# Patient Record
Sex: FEMALE | Race: WHITE | NOT HISPANIC OR LATINO | URBAN - METROPOLITAN AREA
[De-identification: names, ages, dates, MRNs, and addresses within clinical notes are randomized per-mention and may not be internally consistent; named-entity substitution may affect disease eponyms.]

---

## 2018-03-19 ENCOUNTER — OFFICE VISIT CONVERTED (OUTPATIENT)
Dept: ONCOLOGY | Facility: HOSPITAL | Age: 38
End: 2018-03-19
Attending: INTERNAL MEDICINE

## 2018-05-08 ENCOUNTER — OFFICE VISIT CONVERTED (OUTPATIENT)
Dept: INTERNAL MEDICINE | Facility: CLINIC | Age: 38
End: 2018-05-08
Attending: INTERNAL MEDICINE

## 2018-05-30 ENCOUNTER — OFFICE VISIT CONVERTED (OUTPATIENT)
Dept: ONCOLOGY | Facility: HOSPITAL | Age: 38
End: 2018-05-30
Attending: NURSE PRACTITIONER

## 2018-08-29 ENCOUNTER — OFFICE VISIT CONVERTED (OUTPATIENT)
Dept: ONCOLOGY | Facility: HOSPITAL | Age: 38
End: 2018-08-29
Attending: NURSE PRACTITIONER

## 2018-08-31 ENCOUNTER — OFFICE VISIT CONVERTED (OUTPATIENT)
Dept: INTERNAL MEDICINE | Facility: CLINIC | Age: 38
End: 2018-08-31
Attending: PHYSICIAN ASSISTANT

## 2018-09-20 ENCOUNTER — OFFICE VISIT CONVERTED (OUTPATIENT)
Dept: ORTHOPEDIC SURGERY | Facility: CLINIC | Age: 38
End: 2018-09-20
Attending: ORTHOPAEDIC SURGERY

## 2018-09-20 ENCOUNTER — CONVERSION ENCOUNTER (OUTPATIENT)
Dept: ORTHOPEDIC SURGERY | Facility: CLINIC | Age: 38
End: 2018-09-20

## 2018-10-26 ENCOUNTER — CONVERSION ENCOUNTER (OUTPATIENT)
Dept: ORTHOPEDIC SURGERY | Facility: CLINIC | Age: 38
End: 2018-10-26

## 2018-10-26 ENCOUNTER — OFFICE VISIT CONVERTED (OUTPATIENT)
Dept: ORTHOPEDIC SURGERY | Facility: CLINIC | Age: 38
End: 2018-10-26
Attending: PHYSICIAN ASSISTANT

## 2018-11-09 ENCOUNTER — OFFICE VISIT CONVERTED (OUTPATIENT)
Dept: ORTHOPEDIC SURGERY | Facility: CLINIC | Age: 38
End: 2018-11-09
Attending: PHYSICIAN ASSISTANT

## 2018-12-20 ENCOUNTER — OFFICE VISIT CONVERTED (OUTPATIENT)
Dept: ORTHOPEDIC SURGERY | Facility: CLINIC | Age: 38
End: 2018-12-20
Attending: PHYSICIAN ASSISTANT

## 2019-01-17 ENCOUNTER — OFFICE VISIT CONVERTED (OUTPATIENT)
Dept: ORTHOPEDIC SURGERY | Facility: CLINIC | Age: 39
End: 2019-01-17
Attending: PHYSICIAN ASSISTANT

## 2019-01-28 ENCOUNTER — OFFICE VISIT CONVERTED (OUTPATIENT)
Dept: INTERNAL MEDICINE | Facility: CLINIC | Age: 39
End: 2019-01-28
Attending: INTERNAL MEDICINE

## 2019-03-04 ENCOUNTER — HOSPITAL ENCOUNTER (OUTPATIENT)
Dept: OTHER | Facility: HOSPITAL | Age: 39
Discharge: HOME | End: 2019-03-04
Attending: INTERNAL MEDICINE

## 2019-03-04 LAB
ALBUMIN SERPL-MCNC: 3.8 G/DL (ref 3.5–5)
ALBUMIN/GLOB SERPL: 1.4 {RATIO} (ref 1.4–2.6)
ALP SERPL-CCNC: 69 U/L (ref 42–98)
ALT SERPL-CCNC: 8 U/L (ref 10–40)
ANION GAP SERPL CALC-SCNC: 15 MMOL/L (ref 8–19)
AST SERPL-CCNC: 15 U/L (ref 15–50)
BASOPHILS # BLD AUTO: 0.04 10*3/UL (ref 0–0.2)
BASOPHILS NFR BLD AUTO: 0.8 % (ref 0–3)
BILIRUB SERPL-MCNC: 0.36 MG/DL (ref 0.2–1.3)
BUN SERPL-MCNC: 13 MG/DL (ref 5–25)
BUN/CREAT SERPL: 14 {RATIO} (ref 6–20)
CALCIUM SERPL-MCNC: 9 MG/DL (ref 8.7–10.4)
CHLORIDE SERPL-SCNC: 103 MMOL/L (ref 99–111)
CHOLEST SERPL-MCNC: 194 MG/DL (ref 107–200)
CHOLEST/HDLC SERPL: 1.9 {RATIO} (ref 3–6)
CONV ABS IMM GRAN: 0 10*3/UL (ref 0–0.2)
CONV CO2: 26 MMOL/L (ref 22–32)
CONV IMMATURE GRAN: 0 % (ref 0–1.8)
CONV TOTAL PROTEIN: 6.6 G/DL (ref 6.3–8.2)
CREAT UR-MCNC: 0.91 MG/DL (ref 0.5–0.9)
DEPRECATED RDW RBC AUTO: 43.5 FL (ref 36.4–46.3)
EOSINOPHIL # BLD AUTO: 0.2 10*3/UL (ref 0–0.7)
EOSINOPHIL # BLD AUTO: 4.1 % (ref 0–7)
ERYTHROCYTE [DISTWIDTH] IN BLOOD BY AUTOMATED COUNT: 12.7 % (ref 11.7–14.4)
GFR SERPLBLD BASED ON 1.73 SQ M-ARVRAT: >60 ML/MIN/{1.73_M2}
GLOBULIN UR ELPH-MCNC: 2.8 G/DL (ref 2–3.5)
GLUCOSE SERPL-MCNC: 85 MG/DL (ref 65–99)
HBA1C MFR BLD: 13.7 G/DL (ref 12–16)
HCT VFR BLD AUTO: 41.6 % (ref 37–47)
HDLC SERPL-MCNC: 100 MG/DL (ref 40–60)
LDLC SERPL CALC-MCNC: 78 MG/DL (ref 70–100)
LYMPHOCYTES # BLD AUTO: 1.94 10*3/UL (ref 1–5)
MCH RBC QN AUTO: 30.6 PG (ref 27–31)
MCHC RBC AUTO-ENTMCNC: 32.9 G/DL (ref 33–37)
MCV RBC AUTO: 93.1 FL (ref 81–99)
MONOCYTES # BLD AUTO: 0.31 10*3/UL (ref 0.2–1.2)
MONOCYTES NFR BLD AUTO: 6.4 % (ref 3–10)
NEUTROPHILS # BLD AUTO: 2.33 10*3/UL (ref 2–8)
NEUTROPHILS NFR BLD AUTO: 48.5 % (ref 30–85)
NRBC CBCN: 0 % (ref 0–0.7)
OSMOLALITY SERPL CALC.SUM OF ELEC: 289 MOSM/KG (ref 273–304)
PLATELET # BLD AUTO: 361 10*3/UL (ref 130–400)
PMV BLD AUTO: 9.8 FL (ref 9.4–12.3)
POTASSIUM SERPL-SCNC: 4.1 MMOL/L (ref 3.5–5.3)
RBC # BLD AUTO: 4.47 10*6/UL (ref 4.2–5.4)
SODIUM SERPL-SCNC: 140 MMOL/L (ref 135–147)
T4 FREE SERPL-MCNC: 1.8 NG/DL (ref 0.9–1.8)
TRIGL SERPL-MCNC: 82 MG/DL (ref 40–150)
TSH SERPL-ACNC: 1.65 M[IU]/L (ref 0.27–4.2)
VARIANT LYMPHS NFR BLD MANUAL: 40.2 % (ref 20–45)
VLDLC SERPL-MCNC: 16 MG/DL (ref 5–37)
WBC # BLD AUTO: 4.82 10*3/UL (ref 4.8–10.8)

## 2019-09-30 ENCOUNTER — CONVERSION ENCOUNTER (OUTPATIENT)
Dept: INTERNAL MEDICINE | Facility: CLINIC | Age: 39
End: 2019-09-30

## 2019-09-30 ENCOUNTER — OFFICE VISIT CONVERTED (OUTPATIENT)
Dept: INTERNAL MEDICINE | Facility: CLINIC | Age: 39
End: 2019-09-30
Attending: NURSE PRACTITIONER

## 2019-11-11 ENCOUNTER — HOSPITAL ENCOUNTER (OUTPATIENT)
Dept: OTHER | Facility: HOSPITAL | Age: 39
Discharge: HOME | End: 2019-11-11
Attending: NURSE PRACTITIONER

## 2019-11-11 ENCOUNTER — OFFICE VISIT CONVERTED (OUTPATIENT)
Dept: INTERNAL MEDICINE | Facility: CLINIC | Age: 39
End: 2019-11-11
Attending: NURSE PRACTITIONER

## 2019-11-11 ENCOUNTER — CONVERSION ENCOUNTER (OUTPATIENT)
Dept: INTERNAL MEDICINE | Facility: CLINIC | Age: 39
End: 2019-11-11

## 2019-11-11 LAB
ALBUMIN SERPL-MCNC: 3.9 G/DL (ref 3.5–5)
ALBUMIN/GLOB SERPL: 1.3 {RATIO} (ref 1.4–2.6)
ALP SERPL-CCNC: 80 U/L (ref 42–98)
ALT SERPL-CCNC: 8 U/L (ref 10–40)
ANION GAP SERPL CALC-SCNC: 17 MMOL/L (ref 8–19)
AST SERPL-CCNC: 16 U/L (ref 15–50)
BASOPHILS # BLD AUTO: 0.05 10*3/UL (ref 0–0.2)
BASOPHILS NFR BLD AUTO: 0.8 % (ref 0–3)
BILIRUB SERPL-MCNC: 0.28 MG/DL (ref 0.2–1.3)
BUN SERPL-MCNC: 12 MG/DL (ref 5–25)
BUN/CREAT SERPL: 13 {RATIO} (ref 6–20)
CALCIUM SERPL-MCNC: 9 MG/DL (ref 8.7–10.4)
CHLORIDE SERPL-SCNC: 103 MMOL/L (ref 99–111)
CONV ABS IMM GRAN: 0.03 10*3/UL (ref 0–0.2)
CONV CO2: 24 MMOL/L (ref 22–32)
CONV IMMATURE GRAN: 0.5 % (ref 0–1.8)
CONV TOTAL PROTEIN: 7 G/DL (ref 6.3–8.2)
CREAT UR-MCNC: 0.89 MG/DL (ref 0.5–0.9)
DEPRECATED RDW RBC AUTO: 44.4 FL (ref 36.4–46.3)
EOSINOPHIL # BLD AUTO: 0.16 10*3/UL (ref 0–0.7)
EOSINOPHIL # BLD AUTO: 2.6 % (ref 0–7)
ERYTHROCYTE [DISTWIDTH] IN BLOOD BY AUTOMATED COUNT: 13.1 % (ref 11.7–14.4)
GFR SERPLBLD BASED ON 1.73 SQ M-ARVRAT: >60 ML/MIN/{1.73_M2}
GLOBULIN UR ELPH-MCNC: 3.1 G/DL (ref 2–3.5)
GLUCOSE SERPL-MCNC: 82 MG/DL (ref 65–99)
HCT VFR BLD AUTO: 43.2 % (ref 37–47)
HGB BLD-MCNC: 13.7 G/DL (ref 12–16)
LYMPHOCYTES # BLD AUTO: 1.84 10*3/UL (ref 1–5)
LYMPHOCYTES NFR BLD AUTO: 30.3 % (ref 20–45)
MCH RBC QN AUTO: 29.5 PG (ref 27–31)
MCHC RBC AUTO-ENTMCNC: 31.7 G/DL (ref 33–37)
MCV RBC AUTO: 93.1 FL (ref 81–99)
MONOCYTES # BLD AUTO: 0.48 10*3/UL (ref 0.2–1.2)
MONOCYTES NFR BLD AUTO: 7.9 % (ref 3–10)
NEUTROPHILS # BLD AUTO: 3.52 10*3/UL (ref 2–8)
NEUTROPHILS NFR BLD AUTO: 57.9 % (ref 30–85)
NRBC CBCN: 0 % (ref 0–0.7)
OSMOLALITY SERPL CALC.SUM OF ELEC: 289 MOSM/KG (ref 273–304)
PLATELET # BLD AUTO: 361 10*3/UL (ref 130–400)
PMV BLD AUTO: 10.3 FL (ref 9.4–12.3)
POTASSIUM SERPL-SCNC: 4.3 MMOL/L (ref 3.5–5.3)
RBC # BLD AUTO: 4.64 10*6/UL (ref 4.2–5.4)
SODIUM SERPL-SCNC: 140 MMOL/L (ref 135–147)
T4 FREE SERPL-MCNC: 1.3 NG/DL (ref 0.9–1.8)
TSH SERPL-ACNC: 1.27 M[IU]/L (ref 0.27–4.2)
WBC # BLD AUTO: 6.08 10*3/UL (ref 4.8–10.8)

## 2019-11-13 ENCOUNTER — HOSPITAL ENCOUNTER (OUTPATIENT)
Dept: CARDIOLOGY | Facility: HOSPITAL | Age: 39
Discharge: HOME | End: 2019-11-13
Attending: NURSE PRACTITIONER

## 2019-12-20 ENCOUNTER — OFFICE VISIT CONVERTED (OUTPATIENT)
Dept: INTERNAL MEDICINE | Facility: CLINIC | Age: 39
End: 2019-12-20
Attending: PHYSICIAN ASSISTANT

## 2020-03-31 ENCOUNTER — HOSPITAL ENCOUNTER (OUTPATIENT)
Dept: OTHER | Facility: HOSPITAL | Age: 40
Discharge: HOME | End: 2020-03-31
Attending: NURSE PRACTITIONER

## 2020-03-31 ENCOUNTER — OFFICE VISIT CONVERTED (OUTPATIENT)
Dept: INTERNAL MEDICINE | Facility: CLINIC | Age: 40
End: 2020-03-31
Attending: NURSE PRACTITIONER

## 2020-04-03 ENCOUNTER — TELEMEDICINE CONVERTED (OUTPATIENT)
Dept: INTERNAL MEDICINE | Facility: CLINIC | Age: 40
End: 2020-04-03
Attending: NURSE PRACTITIONER

## 2020-04-22 ENCOUNTER — HOSPITAL ENCOUNTER (OUTPATIENT)
Dept: LAB | Facility: HOSPITAL | Age: 40
Discharge: HOME | End: 2020-04-22
Attending: INTERNAL MEDICINE

## 2020-04-22 LAB
ALBUMIN SERPL-MCNC: 3.8 G/DL (ref 3.5–5)
ALBUMIN/GLOB SERPL: 1.4 {RATIO} (ref 1.4–2.6)
ALP SERPL-CCNC: 67 U/L (ref 42–98)
ALT SERPL-CCNC: 8 U/L (ref 10–40)
ANION GAP SERPL CALC-SCNC: 17 MMOL/L (ref 8–19)
AST SERPL-CCNC: 14 U/L (ref 15–50)
BASOPHILS # BLD AUTO: 0.04 10*3/UL (ref 0–0.2)
BASOPHILS NFR BLD AUTO: 0.7 % (ref 0–3)
BILIRUB SERPL-MCNC: 0.19 MG/DL (ref 0.2–1.3)
BUN SERPL-MCNC: 11 MG/DL (ref 5–25)
BUN/CREAT SERPL: 14 {RATIO} (ref 6–20)
CALCIUM SERPL-MCNC: 9 MG/DL (ref 8.7–10.4)
CHLORIDE SERPL-SCNC: 102 MMOL/L (ref 99–111)
CONV ABS IMM GRAN: 0.01 10*3/UL (ref 0–0.2)
CONV CO2: 24 MMOL/L (ref 22–32)
CONV IMMATURE GRAN: 0.2 % (ref 0–1.8)
CONV TOTAL PROTEIN: 6.6 G/DL (ref 6.3–8.2)
CREAT UR-MCNC: 0.78 MG/DL (ref 0.5–0.9)
DEPRECATED RDW RBC AUTO: 47.3 FL (ref 36.4–46.3)
EOSINOPHIL # BLD AUTO: 0.11 10*3/UL (ref 0–0.7)
EOSINOPHIL # BLD AUTO: 1.9 % (ref 0–7)
ERYTHROCYTE [DISTWIDTH] IN BLOOD BY AUTOMATED COUNT: 14.3 % (ref 11.7–14.4)
FERRITIN SERPL-MCNC: 11 NG/ML (ref 10–200)
GFR SERPLBLD BASED ON 1.73 SQ M-ARVRAT: >60 ML/MIN/{1.73_M2}
GLOBULIN UR ELPH-MCNC: 2.8 G/DL (ref 2–3.5)
GLUCOSE SERPL-MCNC: 125 MG/DL (ref 65–99)
HCT VFR BLD AUTO: 38.3 % (ref 37–47)
HGB BLD-MCNC: 11.7 G/DL (ref 12–16)
IRON SATN MFR SERPL: 10 % (ref 20–55)
IRON SERPL-MCNC: 57 UG/DL (ref 60–170)
LYMPHOCYTES # BLD AUTO: 1.71 10*3/UL (ref 1–5)
LYMPHOCYTES NFR BLD AUTO: 29.5 % (ref 20–45)
MCH RBC QN AUTO: 27.7 PG (ref 27–31)
MCHC RBC AUTO-ENTMCNC: 30.5 G/DL (ref 33–37)
MCV RBC AUTO: 90.5 FL (ref 81–99)
MONOCYTES # BLD AUTO: 0.36 10*3/UL (ref 0.2–1.2)
MONOCYTES NFR BLD AUTO: 6.2 % (ref 3–10)
NEUTROPHILS # BLD AUTO: 3.56 10*3/UL (ref 2–8)
NEUTROPHILS NFR BLD AUTO: 61.5 % (ref 30–85)
NRBC CBCN: 0 % (ref 0–0.7)
OSMOLALITY SERPL CALC.SUM OF ELEC: 289 MOSM/KG (ref 273–304)
PLATELET # BLD AUTO: 386 10*3/UL (ref 130–400)
PMV BLD AUTO: 10.3 FL (ref 9.4–12.3)
POTASSIUM SERPL-SCNC: 4 MMOL/L (ref 3.5–5.3)
RBC # BLD AUTO: 4.23 10*6/UL (ref 4.2–5.4)
SODIUM SERPL-SCNC: 139 MMOL/L (ref 135–147)
T4 FREE SERPL-MCNC: 1.1 NG/DL (ref 0.9–1.8)
TIBC SERPL-MCNC: 586 UG/DL (ref 245–450)
TRANSFERRIN SERPL-MCNC: 410 MG/DL (ref 250–380)
TSH SERPL-ACNC: 1.35 M[IU]/L (ref 0.27–4.2)
VIT B12 SERPL-MCNC: 246 PG/ML (ref 211–911)
WBC # BLD AUTO: 5.79 10*3/UL (ref 4.8–10.8)

## 2020-04-29 ENCOUNTER — HOSPITAL ENCOUNTER (OUTPATIENT)
Dept: INFUSION THERAPY | Facility: HOSPITAL | Age: 40
Setting detail: RECURRING SERIES
Discharge: HOME | End: 2020-05-06
Attending: INTERNAL MEDICINE

## 2020-07-07 ENCOUNTER — TELEMEDICINE CONVERTED (OUTPATIENT)
Dept: INTERNAL MEDICINE | Facility: CLINIC | Age: 40
End: 2020-07-07
Attending: INTERNAL MEDICINE

## 2020-07-07 ENCOUNTER — HOSPITAL ENCOUNTER (OUTPATIENT)
Dept: OTHER | Facility: HOSPITAL | Age: 40
Discharge: HOME | End: 2020-07-07
Attending: INTERNAL MEDICINE

## 2020-07-08 LAB
FERRITIN SERPL-MCNC: 127 NG/ML (ref 10–200)
IRON SATN MFR SERPL: 41 % (ref 20–55)
IRON SERPL-MCNC: 129 UG/DL (ref 60–170)
TIBC SERPL-MCNC: 312 UG/DL (ref 245–450)
TRANSFERRIN SERPL-MCNC: 218 MG/DL (ref 250–380)

## 2020-07-14 ENCOUNTER — TELEMEDICINE CONVERTED (OUTPATIENT)
Dept: INTERNAL MEDICINE | Facility: CLINIC | Age: 40
End: 2020-07-14
Attending: INTERNAL MEDICINE

## 2020-09-21 ENCOUNTER — TELEMEDICINE CONVERTED (OUTPATIENT)
Dept: INTERNAL MEDICINE | Facility: CLINIC | Age: 40
End: 2020-09-21
Attending: INTERNAL MEDICINE

## 2020-10-12 ENCOUNTER — OFFICE VISIT CONVERTED (OUTPATIENT)
Dept: INTERNAL MEDICINE | Facility: CLINIC | Age: 40
End: 2020-10-12
Attending: INTERNAL MEDICINE

## 2020-10-12 ENCOUNTER — HOSPITAL ENCOUNTER (OUTPATIENT)
Dept: OTHER | Facility: HOSPITAL | Age: 40
Discharge: HOME | End: 2020-10-12
Attending: INTERNAL MEDICINE

## 2020-10-13 LAB
FOLATE SERPL-MCNC: 4.8 NG/ML (ref 4.8–20)
VIT B12 SERPL-MCNC: 344 PG/ML (ref 211–911)

## 2020-11-12 ENCOUNTER — TELEMEDICINE CONVERTED (OUTPATIENT)
Dept: INTERNAL MEDICINE | Facility: CLINIC | Age: 40
End: 2020-11-12
Attending: INTERNAL MEDICINE

## 2020-11-25 ENCOUNTER — TELEPHONE CONVERTED (OUTPATIENT)
Dept: INTERNAL MEDICINE | Facility: CLINIC | Age: 40
End: 2020-11-25
Attending: PHYSICIAN ASSISTANT

## 2020-12-06 ENCOUNTER — HOSPITAL ENCOUNTER (OUTPATIENT)
Dept: URGENT CARE | Facility: CLINIC | Age: 40
Discharge: HOME | End: 2020-12-06

## 2020-12-17 ENCOUNTER — TELEMEDICINE CONVERTED (OUTPATIENT)
Dept: INTERNAL MEDICINE | Facility: CLINIC | Age: 40
End: 2020-12-17
Attending: INTERNAL MEDICINE

## 2021-01-15 ENCOUNTER — TELEMEDICINE CONVERTED (OUTPATIENT)
Dept: INTERNAL MEDICINE | Facility: CLINIC | Age: 41
End: 2021-01-15
Attending: INTERNAL MEDICINE

## 2021-03-03 ENCOUNTER — HOSPITAL ENCOUNTER (OUTPATIENT)
Dept: INTERNAL MEDICINE | Facility: CLINIC | Age: 41
Discharge: HOME | End: 2021-03-03
Attending: INTERNAL MEDICINE

## 2021-03-03 LAB
ALBUMIN SERPL-MCNC: 4.1 G/DL (ref 3.5–5)
ALBUMIN/GLOB SERPL: 2.1 {RATIO} (ref 1.4–2.6)
ALP SERPL-CCNC: 83 U/L (ref 42–98)
ALT SERPL-CCNC: 18 U/L (ref 10–40)
ANION GAP SERPL CALC-SCNC: 13 MMOL/L (ref 8–19)
AST SERPL-CCNC: 19 U/L (ref 15–50)
BASOPHILS # BLD AUTO: 0.08 10*3/UL (ref 0–0.2)
BASOPHILS NFR BLD AUTO: 1.2 % (ref 0–3)
BILIRUB SERPL-MCNC: 0.25 MG/DL (ref 0.2–1.3)
BUN SERPL-MCNC: 12 MG/DL (ref 5–25)
BUN/CREAT SERPL: 14 {RATIO} (ref 6–20)
CALCIUM SERPL-MCNC: 8.7 MG/DL (ref 8.7–10.4)
CHLORIDE SERPL-SCNC: 108 MMOL/L (ref 99–111)
CHOLEST SERPL-MCNC: 191 MG/DL (ref 107–200)
CHOLEST/HDLC SERPL: 2.2 {RATIO} (ref 3–6)
CONV ABS IMM GRAN: 0.02 10*3/UL (ref 0–0.2)
CONV CO2: 22 MMOL/L (ref 22–32)
CONV IMMATURE GRAN: 0.3 % (ref 0–1.8)
CONV TOTAL PROTEIN: 6.1 G/DL (ref 6.3–8.2)
CREAT UR-MCNC: 0.86 MG/DL (ref 0.5–0.9)
DEPRECATED RDW RBC AUTO: 46.8 FL (ref 36.4–46.3)
EOSINOPHIL # BLD AUTO: 0.3 10*3/UL (ref 0–0.7)
EOSINOPHIL # BLD AUTO: 4.3 % (ref 0–7)
ERYTHROCYTE [DISTWIDTH] IN BLOOD BY AUTOMATED COUNT: 12.9 % (ref 11.7–14.4)
GFR SERPLBLD BASED ON 1.73 SQ M-ARVRAT: >60 ML/MIN/{1.73_M2}
GLOBULIN UR ELPH-MCNC: 2 G/DL (ref 2–3.5)
GLUCOSE SERPL-MCNC: 78 MG/DL (ref 65–99)
HCT VFR BLD AUTO: 44.7 % (ref 37–47)
HDLC SERPL-MCNC: 86 MG/DL (ref 40–60)
HGB BLD-MCNC: 14.5 G/DL (ref 12–16)
LDLC SERPL CALC-MCNC: 92 MG/DL (ref 70–100)
LYMPHOCYTES # BLD AUTO: 2.42 10*3/UL (ref 1–5)
LYMPHOCYTES NFR BLD AUTO: 34.9 % (ref 20–45)
MCH RBC QN AUTO: 31.9 PG (ref 27–31)
MCHC RBC AUTO-ENTMCNC: 32.4 G/DL (ref 33–37)
MCV RBC AUTO: 98.2 FL (ref 81–99)
MONOCYTES # BLD AUTO: 0.45 10*3/UL (ref 0.2–1.2)
MONOCYTES NFR BLD AUTO: 6.5 % (ref 3–10)
NEUTROPHILS # BLD AUTO: 3.67 10*3/UL (ref 2–8)
NEUTROPHILS NFR BLD AUTO: 52.8 % (ref 30–85)
NRBC CBCN: 0 % (ref 0–0.7)
OSMOLALITY SERPL CALC.SUM OF ELEC: 287 MOSM/KG (ref 273–304)
PLATELET # BLD AUTO: 374 10*3/UL (ref 130–400)
PMV BLD AUTO: 9.9 FL (ref 9.4–12.3)
POTASSIUM SERPL-SCNC: 3.8 MMOL/L (ref 3.5–5.3)
RBC # BLD AUTO: 4.55 10*6/UL (ref 4.2–5.4)
SODIUM SERPL-SCNC: 139 MMOL/L (ref 135–147)
TRIGL SERPL-MCNC: 63 MG/DL (ref 40–150)
TSH SERPL-ACNC: 1.45 M[IU]/L (ref 0.27–4.2)
VLDLC SERPL-MCNC: 13 MG/DL (ref 5–37)
WBC # BLD AUTO: 6.94 10*3/UL (ref 4.8–10.8)

## 2021-03-04 LAB
IRON SATN MFR SERPL: 50 % (ref 20–55)
IRON SERPL-MCNC: 159 UG/DL (ref 60–170)
TIBC SERPL-MCNC: 316 UG/DL (ref 245–450)
TRANSFERRIN SERPL-MCNC: 221 MG/DL (ref 250–380)

## 2021-05-12 NOTE — PROGRESS NOTES
"   Progress Note      Patient Name: Josiane Higuera   Patient ID: [id number]   Sex: Female   YOB: 1980    Primary Care Provider: Cortez Byrd MD    Visit Date: March 31, 2020    Provider: EDSON Richards   Location: [facility]   Location Address: [address] [place] [address], [facility] [address]  [place], [place]  [id number]   Location Phone: [phone]          Chief Complaint  · \"Fever\"  · \"my lungs and chest hurts\"       History Of Present Illness  Josiane Higuera is a 39 year old /White female who presents for evaluation and treatment of:      Chest \"hurts\" feels like her lungs hurt when she breathes in deep.   Fever, highest at home was 102.6 this morning.  took Tylenol last night and this am  Headache, recurrent, mild.   Neck pain, mild unsure if related to illness.   Denies cough, n/v, diarrhea.  hard to take a deep breath  has stayed at home       Past Medical History  Disease Name Date Onset Notes   Allergic rhinitis --  --    Anemia --  --    Anemia, Unspecified --  --    Anxiety --  --    Arthritis --  --    B12 deficiency --  --    Broken Bones --  --    GERD (gastroesophageal reflux disease) --  --    Hemorrhoids --  During pregnancy   High blood pressure --  During pregnancy   Migraine headache --  --    Reflux --  --    Seasonal allergies --  --          Past Surgical History  Procedure Name Date Notes   Appendectomy --  --    Artificial Joints/Limbs --  --    bowel obstruction 2010 Scar tissue from bypass. Dr. Salinas as MD on case.   Ceasarian section 2015 & 2018 --    Cesarian Section --  --    Colonoscopy --  --    Gallbladder --  --    Gastric Bypass 2009 --    Joint Surgery --  --    Metal implants --  --    Tonsillectomy --  --    Tonsillectomy and adenoidectomy in patient age 12 or over --  --    Total hip replacement 2014 Right hip         Medication List  Name Date Started Instructions   cyanocobalamin (vitamin B-12) 1,000 mcg/mL injection solution 04/29/2019 INJECT 1 ML " INTRAMUSCULARLY ONCE MONTH   gabapentin 400 mg oral capsule  take 1 capsule (400 mg) by oral route 3 times per day   levocetirizine 5 mg oral tablet  take 1 tablet (5 mg) by oral route once daily in the evening   Lexapro 10 mg oral tablet 11/11/2019 take 1 tablet (10 mg) by oral route once daily for 90 days   methocarbamol 750 mg oral tablet  take 1 tablet (750 mg) by oral route 3 times per day   omeprazole 20 mg oral capsule,delayed release(DR/EC)  take 1 capsule (20 mg) by oral route once daily before a meal   oxycodone 5 mg oral capsule  take 1 capsule (5 mg) by oral route every 6 hours as needed for pain         Allergy List  Allergen Name Date Reaction Notes   aspirin --  --  --    diclofenac sodium --  --  --    Feldene --  --  --    hydrocodone-acetaminophen --  --  --    meloxicam --  --  --    morphine --  --  --    naproxen --  --  --        Allergies Reconciled  Family Medical History  Disease Name Relative/Age Notes   Heart Disease Father/   Father  Mother, aunt/uncle   Cancer, Unspecified Father/   Father  Head/Neck Cancer   Lung cancer  Grandparent   Family history of certain chronic disabling diseases; arthritis Mother/   Mother   Osteoporosis Mother/   Mother         Social History  Finding Status Start/Stop Quantity Notes   Alcohol Use Current some day --/-- --  occasionally drinks, less than 1 drink per day, has been drinking for 2 years   Homemaker. --  --/-- --  --    lives with children --  --/-- --  --    lives with spouse --  --/-- --  --    . --  --/-- --  --    Recreational Drug Use Never --/-- --  no   Tobacco Never --/-- --  never smoker         Immunizations  NameDate Admin Mfg Trade Name Lot Number Route Inj VIS Given VIS Publication   Hepatitis A11/16/2018 [employer] HAVRIX-ADULT [id number] IM RD 11/16/2018 07/20/2016   Comments: Tolerated well   Hepatitis A05/08/2018 [facility] HAVRIX-ADULT [id number] IM RD 05/08/2018 10/25/2011   Comments: Pt tolerated well left office in stable condition. CH RN  "  Dtkhuvdug32/30/2019 [facility] Fluzone Quadrivalent [id number] IM RD 09/30/2019    Comments: Patient tolerated well and left office in stable condition.   Zlueoztzx07/16/2018 [facility] Fluzone Quadrivalent [id number] IM LD 11/16/2018 08/19/2014   Comments: Tolerated well         Review of Systems  · Constitutional  o Denies  o : fever, fatigue, weight loss, weight gain  · Cardiovascular  o Denies  o : lower extremity edema, claudication, chest pressure, palpitations  · Respiratory  o Denies  o : shortness of breath, wheezing, cough, hemoptysis, dyspnea on exertion  · Gastrointestinal  o Denies  o : nausea, vomiting, diarrhea, constipation, abdominal pain      Vitals  Date Time BP Position Site L\R Cuff Size HR RR TEMP (F) WT  HT  BMI kg/m2 BSA m2 O2 Sat HC       11/11/2019 10:26 /80 Sitting    74 - R 14 98.2 173lbs 0oz 5'  2\" 31.64 1.85 99 %    12/20/2019 02:16 /82 Sitting    73 - R 15 98.1 172lbs 0oz 5'  2\" 31.46 1.85 98 %    03/31/2020 01:08 /70 Sitting    91 - R  98.3 170lbs 0oz 5'  2\" 31.09 1.84 98 %          Physical Examination  · Constitutional  o Appearance  o : no acute distress, well-nourished  · Head and Face  o Head  o :   § Inspection  § : atraumatic, normocephalic  · Eyes  o Eyes  o : extraocular movements intact, no scleral icterus, no conjunctival injection  · Ears, Nose, Mouth and Throat  o Ears  o :   § External Ears  § : normal  o Nose  o :   § Intranasal Exam  § : nares patent  o Oral Cavity  o :   § Oral Mucosa  § : moist mucous membranes  · Respiratory  o Respiratory Effort  o : breathing comfortably, symmetric chest rise  o Auscultation of Lungs  o : clear to asculatation on left, decreased air movement of right lower lobe with rales on right upper  · Cardiovascular  o Heart  o :   § Auscultation of Heart  § : regular rate and rhythm, no murmurs, rubs, or gallops  o Peripheral Vascular System  o :   § Extremities  § : no edema  · Lymphatic  o Neck  o : no lymphadenopathy " present  · Neurologic  o Mental Status Examination  o :   § Orientation  § : grossly oriented to person, place and time  o Gait and Station  o :   § Gait Screening  § : normal gait  · Psychiatric  o General  o : normal mood and affect          Results  · In-Office Procedures  o Lab procedure  § IOP - Influenza A/B Test (52178)   § Influenza A: Negative   § Influenza B: Negative   § Internal Control Verified?: Yes       Assessment  · Cough     786.2/R05  · Pneumonia     486/J18.9  will treat with Levaquin given size of pneumonia. albuterol inhaler as needed. She will call to schedule telehealth visit in 2-3 days. discussed continue supportive care--pushing fluids, rest, Tylenol prn. recommended cough and deep breathing.  discussed risk/benefit of repeat imaging in 3-4 weeks. She will consider this if symptoms persist.  · Fever     780.60/R50.9    Problems Reconciled  Plan  · Orders  o ACO-39: Current medications updated and reviewed () - - 03/31/2020  o Chest (AP PA and Lateral) 2 views X-Ray [facility] Preferred View (04947) - 780.60/R50.9, 786.2/R05 - 03/31/2020  · Medications  o Levaquin 750 mg oral tablet   SIG: take 1 tablet (750 mg) by oral route once daily for 7 days   DISP: (7) tablets with 0 refills  Prescribed on 03/31/2020     o albuterol sulfate 90 mcg/actuation inhalation HFA aerosol inhaler   SIG: inhale 1 puff (90 mcg) by inhalation route every 6 hours as needed   DISP: (1) 6.7 gm canister with 0 refills  Prescribed on 03/31/2020     o Medications have been Reconciled  o Transition of Care or Provider Policy  · Instructions  o Patient was educated/instructed on their diagnosis, treatment and medications prior to discharge from the clinic today.  o Call the office with any concerns or questions.  · Disposition  o Call or Return if symptoms worsen or persist.  o Prescriptions sent electronically  o Xray performed in clinic            Electronically Signed by: Rosa Ellison APRN -Author on March 31, 2020  03:05:20 PM

## 2021-05-12 NOTE — PROGRESS NOTES
Progress Note      Patient Name: Josiane Higeura   Patient ID: [id number]   Sex: Female   YOB: 1980    Primary Care Provider: Cortez Byrd MD    Visit Date: April 3, 2020    Provider: EDSON So   Location: [facility]   Location Address: [address] [place] [address], [address]  [place], [place]  [id number]   Location Phone: [phone]          History Of Present Illness  Video Conferencing Visit  Josiane Higuera is a 39 year old /White female who is presenting for evaluation via video conferencing. Verbal consent obtained before beginning visit.   The following staff were present during this visit: EDSON So   Josiane Higuera is a 39 year old /White female who presents for evaluation and treatment of:      Informed patient that as visit is being performed as a video conference there will be no opportunity to obtain vital signs or perform a physical exam. Due to this there is unfortunately a possibility that things may be missed that would typically be noticed during a traditional visit. Patient is aware of this possibility and agrees to proceed with video conference. Patient states there is no other person present for this video conference. Performed via Zoom.    Phone call started: 1131  Phone call ended: 1138    Pneumonia-  Patient diagnosed with pneumonia in clinic x 3 days ago, started on Levaquin. Patient states overall she is feeling much better. Has been fever free x 24 hours without medications. Continues to have headache and fatigue. Denies shortness of breath or chest tightness, has not had to use albuterol inhaler. Has since developed cough.                Past Medical History  Disease Name Date Onset Notes   Allergic rhinitis --  --    Anemia --  --    Anemia, Unspecified --  --    Anxiety --  --    Arthritis --  --    B12 deficiency --  --    Broken Bones --  --    GERD (gastroesophageal reflux disease) --  --    Hemorrhoids --  During pregnancy    High blood pressure --  During pregnancy   Migraine headache --  --    Reflux --  --    Seasonal allergies --  --          Past Surgical History  Procedure Name Date Notes   Appendectomy --  --    Artificial Joints/Limbs --  --    bowel obstruction 2010 Scar tissue from bypass. Dr. Salinas as MD on case.   Ceasarian section 2015 & 2018 --    Cesarian Section --  --    Colonoscopy --  --    Gallbladder --  --    Gastric Bypass 2009 --    Joint Surgery --  --    Metal implants --  --    Tonsillectomy --  --    Tonsillectomy and adenoidectomy in patient age 12 or over --  --    Total hip replacement 2014 Right hip         Medication List  Name Date Started Instructions   albuterol sulfate 90 mcg/actuation inhalation HFA aerosol inhaler 03/31/2020 inhale 1 puff (90 mcg) by inhalation route every 6 hours as needed   cyanocobalamin (vitamin B-12) 1,000 mcg/mL injection solution 04/29/2019 INJECT 1 ML INTRAMUSCULARLY ONCE MONTH   gabapentin 400 mg oral capsule  take 1 capsule (400 mg) by oral route 3 times per day   Levaquin 750 mg oral tablet 03/31/2020 take 1 tablet (750 mg) by oral route once daily for 7 days   levocetirizine 5 mg oral tablet  take 1 tablet (5 mg) by oral route once daily in the evening   Lexapro 10 mg oral tablet 11/11/2019 take 1 tablet (10 mg) by oral route once daily for 90 days   methocarbamol 750 mg oral tablet  take 1 tablet (750 mg) by oral route 3 times per day   omeprazole 20 mg oral capsule,delayed release(DR/EC)  take 1 capsule (20 mg) by oral route once daily before a meal   oxycodone 5 mg oral capsule  take 1 capsule (5 mg) by oral route every 6 hours as needed for pain         Allergy List  Allergen Name Date Reaction Notes   aspirin --  --  --    diclofenac sodium --  --  --    Feldene --  --  --    hydrocodone-acetaminophen --  --  --    meloxicam --  --  --    morphine --  --  --    naproxen --  --  --          Family Medical History  Disease Name Relative/Age Notes   Heart Disease  Father/   Father  Mother, aunt/uncle   Cancer, Unspecified Father/   Father  Head/Neck Cancer   Lung cancer  Grandparent   Family history of certain chronic disabling diseases; arthritis Mother/   Mother   Osteoporosis Mother/   Mother         Social History  Finding Status Start/Stop Quantity Notes   Alcohol Use Current some day --/-- --  occasionally drinks, less than 1 drink per day, has been drinking for 2 years   Homemaker. --  --/-- --  --    lives with children --  --/-- --  --    lives with spouse --  --/-- --  --    . --  --/-- --  --    Recreational Drug Use Never --/-- --  no   Tobacco Never --/-- --  never smoker         Immunizations  NameDate Admin Mfg Trade Name Lot Number Route Inj VIS Given VIS Publication   Hepatitis A11/16/2018 [facility] HAVRIX-ADULT [id number] IM RD 11/16/2018 07/20/2016   Comments: Tolerated well   Hepatitis A05/08/2018 [facility] HAVRIX-ADULT [id number] IM RD 05/08/2018 10/25/2011   Comments: Pt tolerated well left office in stable condition. CH RN   Lomfxbcit71/30/2019 [facility] Fluzone Quadrivalent [id number] IM RD 09/30/2019    Comments: Patient tolerated well and left office in stable condition.   Rydlrxzxs48/16/2018 [facility] Fluzone Quadrivalent [id number] IM LD 11/16/2018 08/19/2014   Comments: Tolerated well         Review of Systems  · Constitutional  o Admits  o : fatigue  o Denies  o : fever, weight loss, weight gain  · Cardiovascular  o Denies  o : lower extremity edema, chest pressure, palpitations  · Respiratory  o Admits  o : cough  o Denies  o : shortness of breath, wheezing, dyspnea on exertion  · Gastrointestinal  o Denies  o : nausea, vomiting, diarrhea, constipation, abdominal pain      Physical Examination  · Constitutional  o Appearance  o : no acute distress, well-nourished  · Head and Face  o Head  o :   § Inspection  § : atraumatic, normocephalic  · Eyes  o Eyes  o : extraocular movements intact, no scleral icterus  · Respiratory  o Respiratory  o : breathing comfortably, symmetric  chest rise; cough noted  · Skin and Subcutaneous Tissue  o General Inspection  o : No visible rash or lesion  · Neurologic  o Cranial Nerves  o : Grossly oriented to person, place, time; without facial droop  · Psychiatric  o General  o : normal mood and affect     Telehealth Physical Exam    General: Well nourished, no acute distress  HENT: Atraumatic, normocephalic  Eyes: Extraocular movements intact, no scleral icterus  Lungs: Breathing comfortably, without cough  Integumentary: No visible rash or lesion  Neurologic: Grossly oriented to person, place, time; without facial droop  Psych: Normal mood and affect               Assessment  · Pneumonia     486/J18.9  Patient improving, continue Levaquin as previously prescribed. Encouraged patient to continue supportive care, including rest, increasing fluids, good hand hygiene, Tylenol as needed for fever/comfort. Patient to continue to monitor and will call or return to clinic if symptoms worsen or persist, she is aware of the 24 hour on call service in the event that there are concerns outside of office hours. Discussed monitoring for fever, cough, shortness of breath, home quarantine when sick, avoiding exposure to other sick contacts, cough etiquette, hand hygiene, disinfecting surfaces, and avoiding touching eyes, nose and mouth. Patient will consider returning for repeat CXR in one month as recommended by radiology if cough persists to ensure resolution.     Problems Reconciled  Plan  · Orders  o ACO-39: Current medications updated and reviewed () - - 04/03/2020  · Medications  o Medications have been Reconciled  o Transition of Care or Provider Policy  · Instructions  o Take all medications as prescribed/directed.  o Rest. Increase Fluids.  o Patient was educated/instructed on their diagnosis, treatment and medications prior to discharge from the clinic today.  o Patient instructed to seek medical attention urgently for new or worsening symptoms.  o Call  the office with any concerns or questions.  · Disposition  o Call or Return if symptoms worsen or persist.  o Follow up as needed            Electronically Signed by: EDSON So -Author on April 3, 2020 11:52:43 AM

## 2021-05-13 NOTE — PROGRESS NOTES
"   Progress Note      Patient Name: Josiane Higuera   Patient ID: [id number]   Sex: Female   YOB: 1980    Primary Care Provider: Cortez Byrd MD    Visit Date: November 25, 2020    Provider: Gwendolyn Ye PA-C   Location: [facility]   Location Address: [address] [place] [address] [address]  [place], [place]  [id number]   Location Phone: [phone]          Chief Complaint  · Telehealth/phone  · \"worsening anxiety would like to start medication Dr. Byrd had previously mentioned\"      History Of Present Illness  TELEHEALTH TELEPHONE VISIT  Josiane Higuera is a 40 year old /White female who is presenting for evaluation via telehealth telephone visit. Verbal consent obtained before beginning visit.   Provider spent 10 minutes with the patient during the telehealth visit.   The following staff were present during this visit: Gwendolyn Ye PA-C.   Past Medical History/ Overview of Patient Symptoms  Josiane Higuera is a 40 year old /White female who presents for evaluation and treatment of:      Worsening anxiety.  Patient states that in her current situation trying to get a divorce from  has caused increased stress.  She states that she is interested in starting propanolol which she had talked to Dr. Hooks about previously.  She states that this was discussed because of her headaches as well to help prevent them since she is weaning off of Topamax.  Patient states that her safety situation has become worse but she does have a safety plan set up with her  and counselor.  She denies SI/HI.             Review of Systems  · Constitutional  o Denies  o : fever, fatigue, weight loss, weight gain  · Cardiovascular  o Denies  o : lower extremity edema, claudication, chest pressure, palpitations  · Respiratory  o Denies  o : shortness of breath, wheezing, cough, hemoptysis, dyspnea on exertion  · Gastrointestinal  o Denies  o : nausea, vomiting, diarrhea, constipation, " abdominal pain          Assessment  · Anxiety     300.02/F41.1  · Headache     784.0/R51  Will start patient on propanolol while tapering down Topamax. Will have patient follow back up in 2 weeks. Call or return for any questions or concerns.   · Adjustment disorder     309.9/F43.20    Problems Reconciled  Plan  · Orders  o Physican Telephone evaluation, 5-10 min (78952) - - 11/25/2020  o ACO-39: Current medications updated and reviewed (1159F, ) - - 11/25/2020  · Medications  o propranolol 20 mg oral tablet   SIG: take 1 tablet by oral route 2 times a day for 30 days   DISP: (60) Tablet with 1 refills  Prescribed on 11/25/2020     o Medications have been Reconciled  o Transition of Care or Provider Policy  · Instructions  o Plan Of Care:   o Take all medications as prescribed/directed.  o Patient was educated/instructed on their diagnosis, treatment and medications prior to discharge from the clinic today.  · Disposition  o Call or Return if symptoms worsen or persist.  o Follow up in 2 weeks  o Medications sent electronically to pharmacy  o Follow up with Dr. Byrd            Electronically Signed by: Gwendolyn Ye PA-C -Author on November 25, 2020 04:28:27 PM

## 2021-05-13 NOTE — PROGRESS NOTES
"   Progress Note      Patient Name: Josiane Higuera   Patient ID: [id number]   Sex: Female   YOB: 1980    Primary Care Provider: Cortez Byrd MD    Visit Date: October 12, 2020    Provider: Cortez Byrd MD   Location: [facility]   Location Address: [address] [place] [address], [address]  [place], [place]  [id number]   Location Phone: [phone]          Chief Complaint  · \"im doing a routine follow up\"  · \"my hands and feet alwasy feel like their falling asleep\"  · \"i feel like im on the verge of hyperventilating everyday\"      History Of Present Illness  Josiane Higuera is a 40 year old /White female who presents for evaluation and treatment of:      HAs- pt reports HAs have been much better. pt reports having cartilage pierced in ears. pt reports not having a single migraine since then. pt has not been using imitrex or Zofran. pt would like to wean Topamax for 300mg.   anxiety- pt reports inc stress recently. pt reports feeling trapped in a marriage currently. pt does not want benzodiazepines. pt has tried buspar, Wellbutrin, Effexor, and hydroxyzine without beneficial effects. pt has plans to go to counseling on 10/19/20. pt also is seeing  in next 2-3 weeks. pt reports feeling safe, but has had some verbal threats if she tried to leave. pt reports not sleeping when she is off Topamax. pt reports previously on trazadone that helped.   flu- pt amenable       Past Medical History  Disease Name Date Onset Notes   Allergic rhinitis --  --    Anemia --  --    Anemia, Unspecified --  --    Anxiety --  --    Arthritis --  --    B12 deficiency --  --    Broken Bones --  --    Gastric bypass status for obesity --  --    GERD (gastroesophageal reflux disease) --  --    Hemorrhoids --  During pregnancy   High blood pressure --  During pregnancy   Migraine headache --  --    Reflux --  --    Seasonal allergies --  --          Past Surgical History  Procedure Name Date Notes " "  Appendectomy --  --    Artificial Joints/Limbs --  --    bowel obstruction 2010 Scar tissue from bypass. Dr. Salinas as MD on case.   Ceasarian section 2015 & 2018 --    Cesarian Section --  --    Colonoscopy --  --    Gallbladder --  --    Gastric Bypass 2009 --    Joint Surgery --  --    Metal implants --  --    Tonsillectomy --  --    Tonsillectomy and adenoidectomy in patient age 12 or over --  --    Total hip replacement 2014 Right hip         Medication List  Name Date Started Instructions   cyanocobalamin (vitamin B-12) 1,000 mcg/mL injection solution 06/29/2020 INJECT 1 ML INTRAMUSCULARLY ONCE WEEKLY FOR 6 WEEKS   escitalopram oxalate 20 mg oral tablet 09/21/2020 TAKE 1 TABLET BY MOUTH EVERY DAY   gabapentin 600 mg oral tablet  take 1 tablet (600 mg) by oral route 3 times per day   Imitrex 50 mg oral tablet 09/21/2020 take 1 tablet (50 mg) by oral route after onset of migraine; may repeat after 2 hours if headache returns, not to exceed 200mg in 24hrs   levocetirizine 5 mg oral tablet  take 1 tablet (5 mg) by oral route once daily in the evening   methocarbamol 750 mg oral tablet  take 1 tablet (750 mg) by oral route 3 times per day   omeprazole 20 mg oral capsule,delayed release(/EC) 07/13/2020 take 1 capsule (20 mg) by oral route once daily before a meal for 90 days   oxycodone 5 mg oral capsule  take 1 capsule (5 mg) by oral route every 6 hours as needed for pain   Syringe 3cc/25Gx1\" 3 mL 25 gauge x 1\" miscellaneous syringe 04/23/2020 use as directed once weekly for Vitamin B12 injections due to deficiency   topiramate 100 mg oral tablet 09/21/2020 take 2 tablets by oral route 2 times a day for 30 days   Zofran 4 mg oral tablet 07/14/2020 take 1 tablet by oral route every 4 hours prn nausea         Allergy List  Allergen Name Date Reaction Notes   aspirin --  --  --    diclofenac sodium --  --  --    Feldene --  --  --    hydrocodone-acetaminophen --  --  --    meloxicam --  --  --    morphine --  --  " "--    naproxen --  --  --        Allergies Reconciled  Family Medical History  Disease Name Relative/Age Notes   Heart Disease Father/   Father  Mother, aunt/uncle   Cancer, Unspecified Father/   Father  Head/Neck Cancer   Lung cancer  Grandparent   Family history of certain chronic disabling diseases; arthritis Mother/   Mother   Osteoporosis Mother/   Mother         Social History  Finding Status Start/Stop Quantity Notes   Alcohol Use Current some day --/-- --  occasionally drinks, less than 1 drink per day, has been drinking for 2 years   Homemaker. --  --/-- --  --    lives with children --  --/-- --  --    lives with spouse --  --/-- --  --    . --  --/-- --  --    Recreational Drug Use Never --/-- --  no   Tobacco Never --/-- --  never smoker         Immunizations  NameDate Admin Mfg Trade Name Lot Number Route Inj VIS Given VIS Publication   Hepatitis A11/16/2018 [facility] HAVRIX-ADULT [id number] IM RD 11/16/2018 07/20/2016   Comments: Tolerated well   Hepatitis A05/08/2018 [facility] HAVRIX-ADULT [id number] IM RD 05/08/2018 10/25/2011   Comments: Pt tolerated well left office in stable condition. CH RN   Ppiiblexn13/12/2020 [facility] Fluzone Quadrivalent [id number] IM LD 10/12/2020 08/15/2019   Comments: Pt tolerated well, left office in stable condition         Review of Systems  · Constitutional  o Denies  o : fever, fatigue, weight loss, weight gain  · Cardiovascular  o Denies  o : lower extremity edema, chest pressure, palpitations  · Respiratory  o Denies  o : shortness of breath, wheezing, cough, dyspnea on exertion  · Gastrointestinal  o Denies  o : nausea, vomiting, diarrhea, constipation, abdominal pain  · Psychiatric  o Admits  o : anxiety  o Denies  o : suicidal ideation, homicidal ideation      Vitals  Date Time BP Position Site L\R Cuff Size HR RR TEMP (F) WT  HT  BMI kg/m2 BSA m2 O2 Sat FR L/min FiO2 HC       12/20/2019 02:16 /82 Sitting    73 - R 15 98.1 172lbs 0oz 5'  2\" 31.46 1.85 98 %      03/31/2020 " "01:08 /70 Sitting    91 - R  98.3 170lbs 0oz 5'  2\" 31.09 1.84 98 %      10/12/2020 01:17 /64 Sitting    69 - R  98 161lbs 0oz 5'  2\" 29.45 1.79 99 %  21%          Physical Examination  · Constitutional  o Appearance  o : no acute distress, well-nourished  · Head and Face  o Head  o :   § Inspection  § : atraumatic, normocephalic  · Eyes  o Eyes  o : extraocular movements intact, no scleral icterus, no conjunctival injection  · Ears, Nose, Mouth and Throat  o Ears  o :   § External Ears  § : normal  o Nose  o :   § Intranasal Exam  § : nares patent  o Oral Cavity  o :   § Oral Mucosa  § : moist mucous membranes  · Respiratory  o Respiratory Effort  o : breathing comfortably, symmetric chest rise  o Auscultation of Lungs  o : clear to asculatation bilaterally, no wheezes, rales, or rhonchii  · Cardiovascular  o Heart  o :   § Auscultation of Heart  § : regular rate and rhythm, no murmurs, rubs, or gallops  o Peripheral Vascular System  o :   § Extremities  § : no edema  · Neurologic  o Mental Status Examination  o :   § Orientation  § : grossly oriented to person, place and time  o Gait and Station  o :   § Gait Screening  § : normal gait  · Psychiatric  o General  o : normal mood and affect          Assessment  · Anxiety disorder     300.00/F41.9  hope for improvement with addition fo trazadone. cont Lexapro. pt denies HI and SI.   · Headache     784.0/R51  doing well at this time. pt will wean off Topamax.   · Insomnia, unspecified     780.52/G47.00  noted while pt weans off Topamax. will start razadone to help. monitor for effectiveness.   · Need for influenza vaccination     V04.81/Z23  · Paresthesia     782.0/R20.2  check B12 and folate levels.   · Vitamin B12 deficiency     266.2/E53.8      Plan  · Orders  o Immunization Admin Fee (Single) ([facility]) (41481) - V04.81/Z23 - 10/12/2020  o Fluzone Quadrivalent Vaccine, age 6 months + (05910) - V04.81/Z23 - 10/12/2020   Vaccine - Influenza; Dose: 0.5; Site: " Left Deltoid; Route: Intramuscular; Date: 10/12/2020 14:12:00; Exp: 06/30/2021; Lot: [id number]; Mfg: sanofi pasteur; TradeName: Fluzone Quadrivalent; Administered By: Paulette Shannon MA; Comment: Pt tolerated well, left office in stable condition  o ACO-14: Influenza immunization administered or previously received () - V04.81/Z23 - 10/12/2020  o ACO-39: Current medications updated and reviewed (, 1159F) - - 10/12/2020  o Vitamin B-12 (70681) - 782.0/R20.2 - 10/12/2020  o Folate (Folic Acid) (11422) - 782.0/R20.2 - 10/12/2020  o IM/SQ - Injection Fee [facility] (25608) - 266.2/E53.8 - 10/12/2020  o Vitamin B12 Injection () - 266.2/E53.8 - 10/12/2020   Injection - Vitamin B12; Dose: 1000 mcg; Site: Not Entered; Route: intramuscular; Date: 10/12/2020 14:16:07; Exp: 04/20/2022; Lot: [id number]; Mfg: LOGAN PHARMACEUTI; TradeName: cyanocobalamin (vitamin B-12); [facility]; Administered By: Paulette Shannon MA; Comment: Pt tolerated well, left office in stable condition  · Medications  o trazodone 50 mg oral tablet   SIG: take 1 tablet (50 mg) by oral route once daily at bedtime for 30 days   DISP: (30) Tablet with 1 refills  Prescribed on 10/12/2020     o Medications have been Reconciled  o Transition of Care or Provider Policy  · Instructions  o Patient was educated/instructed on their diagnosis, treatment and medications prior to discharge from the clinic today.  · Disposition  o f/u in 1 month            Electronically Signed by: Cortez Byrd MD -Author on October 12, 2020 06:00:47 PM

## 2021-05-13 NOTE — PROGRESS NOTES
Progress Note      Patient Name: Josiane Higuera   Patient ID: [id number]   Sex: Female   YOB: 1980    Primary Care Provider: Cortez Byrd MD    Visit Date: September 21, 2020    Provider: Cortez Byrd MD   Location: [facility]   Location Address: [address] [place] [address], [facility]  [place], [place]  [id number]   Location Phone: [phone]          Chief Complaint  · migraine      History Of Present Illness  Video Conferencing Visit  Josiane Higuera is a 40 year old /White female who is presenting for evaluation via video conferencing via [employer]. Verbal consent obtained before beginning visit.   The following staff were present during this visit: Dr. Byrd and pt   Josiane Higuera is a 40 year old /White female who presents for evaluation and treatment of:      migraine- pt is taking Topamax 100mg po BID. pt reports feeling as though there is always a HA in the background. pt reports taking imitrex approx. 3-4 times per week. pt reports having more stress recently. pt has tried magnesium previously.       Past Medical History  Disease Name Date Onset Notes   Allergic rhinitis --  --    Anemia --  --    Anemia, Unspecified --  --    Anxiety --  --    Arthritis --  --    B12 deficiency --  --    Broken Bones --  --    Gastric bypass status for obesity --  --    GERD (gastroesophageal reflux disease) --  --    Hemorrhoids --  During pregnancy   High blood pressure --  During pregnancy   Migraine headache --  --    Reflux --  --    Seasonal allergies --  --          Past Surgical History  Procedure Name Date Notes   Appendectomy --  --    Artificial Joints/Limbs --  --    bowel obstruction 2010 Scar tissue from bypass. Dr. Salinas as MD on case.   Ceasarian section 2015 & 2018 --    Cesarian Section --  --    Colonoscopy --  --    Gallbladder --  --    Gastric Bypass 2009 --    Joint Surgery --  --    Metal implants --  --    Tonsillectomy --  --    Tonsillectomy and  "adenoidectomy in patient age 12 or over --  --    Total hip replacement 2014 Right hip         Medication List  Name Date Started Instructions   cyanocobalamin (vitamin B-12) 1,000 mcg/mL injection solution 06/29/2020 INJECT 1 ML INTRAMUSCULARLY ONCE WEEKLY FOR 6 WEEKS   escitalopram oxalate 20 mg oral tablet 09/14/2020 TAKE 1 TABLET BY MOUTH EVERY DAY   gabapentin 600 mg oral tablet  take 1 tablet (600 mg) by oral route 3 times per day   Imitrex 50 mg oral tablet 07/07/2020 take 1 tablet (50 mg) by oral route after onset of migraine; may repeat after 2 hours if headache returns, not to exceed 200mg in 24hrs   levocetirizine 5 mg oral tablet  take 1 tablet (5 mg) by oral route once daily in the evening   methocarbamol 750 mg oral tablet  take 1 tablet (750 mg) by oral route 3 times per day   omeprazole 20 mg oral capsule,delayed release(DR/EC) 07/13/2020 take 1 capsule (20 mg) by oral route once daily before a meal for 90 days   oxycodone 5 mg oral capsule  take 1 capsule (5 mg) by oral route every 6 hours as needed for pain   Syringe 3cc/25Gx1\" 3 mL 25 gauge x 1\" miscellaneous syringe 04/23/2020 use as directed once weekly for Vitamin B12 injections due to deficiency   topiramate 100 mg oral tablet 08/10/2020 take 1 tablet (100 mg) by oral route 2 times per day for 30 days   Zofran 4 mg oral tablet 07/14/2020 take 1 tablet by oral route every 4 hours prn nausea         Allergy List  Allergen Name Date Reaction Notes   aspirin --  --  --    diclofenac sodium --  --  --    Feldene --  --  --    hydrocodone-acetaminophen --  --  --    meloxicam --  --  --    morphine --  --  --    naproxen --  --  --        Allergies Reconciled  Family Medical History  Disease Name Relative/Age Notes   Heart Disease Father/   Father  Mother, aunt/uncle   Cancer, Unspecified Father/   Father  Head/Neck Cancer   Lung cancer  Grandparent   Family history of certain chronic disabling diseases; arthritis Mother/   Mother   Osteoporosis " Mother/   Mother         Social History  Finding Status Start/Stop Quantity Notes   Alcohol Use Current some day --/-- --  occasionally drinks, less than 1 drink per day, has been drinking for 2 years   Homemaker. --  --/-- --  --    lives with children --  --/-- --  --    lives with spouse --  --/-- --  --    . --  --/-- --  --    Recreational Drug Use Never --/-- --  no   Tobacco Never --/-- --  never smoker         Immunizations  NameDate Admin Mfg Trade Name Lot Number Route Inj VIS Given VIS Publication   Hepatitis A11/16/2018 [facility] HAVRIX-ADULT [id number] IM RD 11/16/2018 07/20/2016   Comments: Tolerated well   Hepatitis A05/08/2018 [facility] HAVRIX-ADULT [id number] IM RD 05/08/2018 10/25/2011   Comments: Pt tolerated well left office in stable condition.  RN   Tswchfpmi59/30/2019 [facility] Fluzone Quadrivalent [id number] IM RD 09/30/2019    Comments: Patient tolerated well and left office in stable condition.   Ibovexbxl42/16/2018 [facility] Fluzone Quadrivalent [id number] IM LD 11/16/2018 08/19/2014   Comments: Tolerated well         Review of Systems  · Constitutional  o Denies  o : fever, fatigue, weight loss, weight gain  · Cardiovascular  o Denies  o : lower extremity edema, chest pressure, palpitations  · Respiratory  o Denies  o : shortness of breath, wheezing, frequent cough, dyspnea on exertion  · Gastrointestinal  o Denies  o : nausea, vomiting, diarrhea, constipation, abdominal pain      Physical Examination     Gen: well-nourished, no acute distress  HENT: atraumatic, normocephalic  Eyes: extraocular movements intact, no scleral icterus  Lung: breathing comfortably, no cough  Neuro: grossly oriented to person, place, and time. no facial droop   Psych: normal mood and affect             Assessment  · Migraine headache     346.90/G43.909  will inc Topamax to 200mg BID. also discussed using vitamin B2 400mg daily as PPx. may discuss coenzyme q10 100mg po TID.       Plan  · Orders  o ACO-39: Current medications updated and  reviewed () - - 09/21/2020  · Medications  o topiramate 100 mg oral tablet   SIG: take 2 tablets by oral route 2 times a day for 30 days   DISP: (120) tablets with 2 refills  Adjusted on 09/21/2020     o escitalopram oxalate 20 mg oral tablet   SIG: TAKE 1 TABLET BY MOUTH EVERY DAY   DISP: (90) Tablet with 2 refills  Refilled on 09/21/2020     o Imitrex 50 mg oral tablet   SIG: take 1 tablet (50 mg) by oral route after onset of migraine; may repeat after 2 hours if headache returns, not to exceed 200mg in 24hrs   DISP: (30) tablets with 0 refills  Refilled on 09/21/2020     o Medications have been Reconciled  o Transition of Care or Provider Policy  · Instructions  o Patient was educated/instructed on their diagnosis, treatment and medications prior to discharge from the clinic today.  · Disposition  o f/u in 1 month            Electronically Signed by: Cortez Byrd MD -Author on September 21, 2020 03:50:09 PM

## 2021-05-13 NOTE — PROGRESS NOTES
"   Progress Note      Patient Name: Josiane Higuera   Patient ID: [id number]   Sex: Female   YOB: 1980    Primary Care Provider: Cortez Byrd MD    Visit Date: July 14, 2020    Provider: Cortez Byrd MD   Location: [facility]   Location Address: [address] [place] [address], [address]  [place], [place]  [id number]   Location Phone: [phone]          Chief Complaint  · \"I am having headaches\"      History Of Present Illness  Video Conferencing Visit  Josiane Higuera is a 40 year old /White female who is presenting for evaluation via video conferencing via [employer]. Verbal consent obtained before beginning visit.   The following staff were present during this visit: Dr. Byrd and pt   Josiane Higuera is a 40 year old /White female who presents for evaluation and treatment of:      HAs- pt has been on Tylenol, caffeine, Benadryl, and mag. pt had migraine cocktail x1 that worked for 1 day, but headache returned. pt reports previously having these HAs after iron infusion. pt reports having Zofran and steroids which helped previously. pt also previously on Topamax for some relief.       Past Medical History  Disease Name Date Onset Notes   Allergic rhinitis --  --    Anemia --  --    Anemia, Unspecified --  --    Anxiety --  --    Arthritis --  --    B12 deficiency --  --    Broken Bones --  --    Gastric bypass status for obesity --  --    GERD (gastroesophageal reflux disease) --  --    Hemorrhoids --  During pregnancy   High blood pressure --  During pregnancy   Migraine headache --  --    Reflux --  --    Seasonal allergies --  --          Past Surgical History  Procedure Name Date Notes   Appendectomy --  --    Artificial Joints/Limbs --  --    bowel obstruction 2010 Scar tissue from bypass. Dr. Salinas as MD on case.   Ceasarian section 2015 & 2018 --    Cesarian Section --  --    Colonoscopy --  --    Gallbladder --  --    Gastric Bypass 2009 --    Joint Surgery --  --  " "  Metal implants --  --    Tonsillectomy --  --    Tonsillectomy and adenoidectomy in patient age 12 or over --  --    Total hip replacement 2014 Right hip         Medication List  Name Date Started Instructions   cyanocobalamin (vitamin B-12) 1,000 mcg/mL injection solution 06/29/2020 INJECT 1 ML INTRAMUSCULARLY ONCE WEEKLY FOR 6 WEEKS   gabapentin 400 mg oral capsule  take 1 capsule (400 mg) by oral route 3 times per day   Imitrex 50 mg oral tablet 07/07/2020 take 1 tablet (50 mg) by oral route after onset of migraine; may repeat after 2 hours if headache returns, not to exceed 200mg in 24hrs   levocetirizine 5 mg oral tablet  take 1 tablet (5 mg) by oral route once daily in the evening   Lexapro 20 mg oral tablet 06/22/2020 take 1 tablet (20 mg) by oral route once daily for 90 days   methocarbamol 750 mg oral tablet  take 1 tablet (750 mg) by oral route 3 times per day   omeprazole 20 mg oral capsule,delayed release(DR/EC) 07/13/2020 take 1 capsule (20 mg) by oral route once daily before a meal for 90 days   oxycodone 5 mg oral capsule  take 1 capsule (5 mg) by oral route every 6 hours as needed for pain   Syringe 3cc/25Gx1\" 3 mL 25 gauge x 1\" miscellaneous syringe 04/23/2020 use as directed once weekly for Vitamin B12 injections due to deficiency         Allergy List  Allergen Name Date Reaction Notes   aspirin --  --  --    diclofenac sodium --  --  --    Feldene --  --  --    hydrocodone-acetaminophen --  --  --    meloxicam --  --  --    morphine --  --  --    naproxen --  --  --        Allergies Reconciled  Family Medical History  Disease Name Relative/Age Notes   Heart Disease Father/   Father  Mother, aunt/uncle   Cancer, Unspecified Father/   Father  Head/Neck Cancer   Lung cancer  Grandparent   Family history of certain chronic disabling diseases; arthritis Mother/   Mother   Osteoporosis Mother/   Mother         Social History  Finding Status Start/Stop Quantity Notes   Alcohol Use Current some day " --/-- --  occasionally drinks, less than 1 drink per day, has been drinking for 2 years   Homemaker. --  --/-- --  --    lives with children --  --/-- --  --    lives with spouse --  --/-- --  --    . --  --/-- --  --    Recreational Drug Use Never --/-- --  no   Tobacco Never --/-- --  never smoker         Immunizations  NameDate Admin Mfg Trade Name Lot Number Route Inj VIS Given VIS Publication   Hepatitis A11/16/2018 [facility] HAVRIX-ADULT [id number] IM RD 11/16/2018 07/20/2016   Comments: Tolerated well   Hepatitis A05/08/2018 [facility] HAVRIX-ADULT [id number] IM RD 05/08/2018 10/25/2011   Comments: Pt tolerated well left office in stable condition.  RN   Lbjruistt06/30/2019 [facility] Fluzone Quadrivalent [id number] IM RD 09/30/2019    Comments: Patient tolerated well and left office in stable condition.   Gzripdvop18/16/2018 [facility] Fluzone Quadrivalent [id number] IM LD 11/16/2018 08/19/2014   Comments: Tolerated well         Review of Systems  · Constitutional  o Denies  o : fever, fatigue, weight loss, weight gain  · HENT  o Admits  o : headaches  o Denies  o : lightheadedness  · Cardiovascular  o Denies  o : lower extremity edema, chest pressure, palpitations  · Respiratory  o Denies  o : shortness of breath, wheezing, cough, dyspnea on exertion  · Gastrointestinal  o Admits  o : nausea  o Denies  o : vomiting, diarrhea, constipation, abdominal pain      Physical Examination     Gen: well-nourished, no acute distress  HENT: atraumatic, normocephalic  Eyes: extraocular movements intact, no scleral icterus  Lung: breathing comfortably, no cough  Neuro: grossly oriented to person, place, and time. no facial droop   Psych: normal mood and affect             Assessment  · Migraine headache     346.90/G43.909  will Rx Zofran, prednisone, and Topamax to help. f/u in 2 days. pt may need to be admitted at that time if no better.     Problems Reconciled  Plan  · Orders  o ACO-39: Current medications updated and reviewed () - -  07/14/2020  · Medications  o Topamax 50 mg oral tablet   SIG: take 1 tablet (50 mg) by oral route 2 times per day for 30 days   DISP: (60) tablets with 0 refills  Prescribed on 07/14/2020     o Zofran 4 mg oral tablet   SIG: take 1 tablet by oral route every 4 hours prn nausea   DISP: (30) tablets with 0 refills  Prescribed on 07/14/2020     o prednisone 20 mg oral tablet   SIG: take 2 tablets (40 mg) by oral route once daily for 5 days   DISP: (10) tablets with 0 refills  Prescribed on 07/14/2020     o Medications have been Reconciled  o Transition of Care or Provider Policy  · Instructions  o Patient was educated/instructed on their diagnosis, treatment and medications prior to discharge from the clinic today.  · Disposition  o Call or Return if symptoms worsen or persist.            Electronically Signed by: Cortez Byrd MD -Author on July 14, 2020 05:34:47 PM

## 2021-05-13 NOTE — PROGRESS NOTES
Progress Note      Patient Name: Josiane Higuera   Patient ID: [id number]   Sex: Female   YOB: 1980    Primary Care Provider: Cortez Byrd MD    Visit Date: July 7, 2020    Provider: Cortez Byrd MD   Location: [facility]   Location Address: [address] [place] [address], [facility]  [place], [place]  [id number]   Location Phone: [phone]          Chief Complaint  · Follow up on Lexapro increase      History Of Present Illness  Video Conferencing Visit  Josiane Higuera is a 40 year old /White female who is presenting for evaluation via video conferencing via Zoom. Verbal consent obtained before beginning visit.   The following staff were present during this visit: Dr. Byrd and pt   Josiane Higuera is a 40 year old /White female who presents for evaluation and treatment of:      anxiety- pt is on higher dose of Lexapro, which has been helping a lot. pt reports having difficulty sleeping.   HA- pt also reports having HAs for approx. 1 month at this time similar to previous migraines. pt has not been taking NSAIDs because of allergy. pt reports migraine cocktail helped previously . pt previously on imitrex with help and would like refill. pt previously on Topamax for approx. 1 year prior to having gastric bypass.  vit B12 def - due for recheck  iron def - pt had iron infusion. requests level recheck.       Past Medical History  Disease Name Date Onset Notes   Allergic rhinitis --  --    Anemia --  --    Anemia, Unspecified --  --    Anxiety --  --    Arthritis --  --    B12 deficiency --  --    Broken Bones --  --    Gastric bypass status for obesity --  --    GERD (gastroesophageal reflux disease) --  --    Hemorrhoids --  During pregnancy   High blood pressure --  During pregnancy   Migraine headache --  --    Reflux --  --    Seasonal allergies --  --          Past Surgical History  Procedure Name Date Notes   Appendectomy --  --    Artificial Joints/Limbs --  --   "  bowel obstruction 2010 Scar tissue from bypass. Dr. Salinas as MD on case.   Ceasarian section 2015 & 2018 --    Cesarian Section --  --    Colonoscopy --  --    Gallbladder --  --    Gastric Bypass 2009 --    Joint Surgery --  --    Metal implants --  --    Tonsillectomy --  --    Tonsillectomy and adenoidectomy in patient age 12 or over --  --    Total hip replacement 2014 Right hip         Medication List  Name Date Started Instructions   cyanocobalamin (vitamin B-12) 1,000 mcg/mL injection solution 06/29/2020 INJECT 1 ML INTRAMUSCULARLY ONCE WEEKLY FOR 6 WEEKS   gabapentin 400 mg oral capsule  take 1 capsule (400 mg) by oral route 3 times per day   levocetirizine 5 mg oral tablet  take 1 tablet (5 mg) by oral route once daily in the evening   Lexapro 20 mg oral tablet 06/22/2020 take 1 tablet (20 mg) by oral route once daily for 90 days   methocarbamol 750 mg oral tablet  take 1 tablet (750 mg) by oral route 3 times per day   omeprazole 20 mg oral capsule,delayed release(DR/EC)  take 1 capsule (20 mg) by oral route once daily before a meal   oxycodone 5 mg oral capsule  take 1 capsule (5 mg) by oral route every 6 hours as needed for pain   Syringe 3cc/25Gx1\" 3 mL 25 gauge x 1\" miscellaneous syringe 04/23/2020 use as directed once weekly for Vitamin B12 injections due to deficiency         Allergy List  Allergen Name Date Reaction Notes   aspirin --  --  --    diclofenac sodium --  --  --    Feldene --  --  --    hydrocodone-acetaminophen --  --  --    meloxicam --  --  --    morphine --  --  --    naproxen --  --  --          Family Medical History  Disease Name Relative/Age Notes   Heart Disease Father/   Father  Mother, aunt/uncle   Cancer, Unspecified Father/   Father  Head/Neck Cancer   Lung cancer  Grandparent   Family history of certain chronic disabling diseases; arthritis Mother/   Mother   Osteoporosis Mother/   Mother         Social History  Finding Status Start/Stop Quantity Notes   Alcohol Use " Current some day --/-- --  occasionally drinks, less than 1 drink per day, has been drinking for 2 years   Homemaker. --  --/-- --  --    lives with children --  --/-- --  --    lives with spouse --  --/-- --  --    . --  --/-- --  --    Recreational Drug Use Never --/-- --  no   Tobacco Never --/-- --  never smoker         Immunizations  NameDate Admin Mfg Trade Name Lot Number Route Inj VIS Given VIS Publication   Hepatitis A11/16/2018 [facility] HAVRIX-ADULT [id number] IM RD 11/16/2018 07/20/2016   Comments: Tolerated well   Hepatitis A05/08/2018 [facility] HAVRIX-ADULT [id number] IM RD 05/08/2018 10/25/2011   Comments: Pt tolerated well left office in stable condition.  RN   Mrxzbwsok16/30/2019 [facility] Fluzone Quadrivalent [id number] IM RD 09/30/2019    Comments: Patient tolerated well and left office in stable condition.   Toxdflrin95/16/2018 [facility] Fluzone Quadrivalent [id number] IM LD 11/16/2018 08/19/2014   Comments: Tolerated well         Review of Systems  · Constitutional  o Denies  o : fever, fatigue, weight loss, weight gain  · HENT  o Admits  o : headaches  o Denies  o : lightheadedness  · Cardiovascular  o Denies  o : lower extremity edema, chest pressure, palpitations  · Respiratory  o Denies  o : shortness of breath, wheezing, frequent cough, dyspnea on exertion  · Gastrointestinal  o Denies  o : nausea, vomiting, diarrhea, constipation, abdominal pain  · Psychiatric  o Admits  o : anxiety  o Denies  o : suicidal ideation, homicidal ideation      Physical Examination     Gen: well-nourished, no acute distress  HENT: atraumatic, normocephalic  Eyes: extraocular movements intact, no scleral icterus  Lung: breathing comfortably, no cough  Neuro: grossly oriented to person, place, and time. no facial droop   Psych: normal mood and affect             Assessment  · Anxiety     300.02/F41.1  doing well on current dose of Lexapro and will cont.   · B12 deficiency     266.2/E53.8  check b12 level  · Headache     784.0/R51  pt to  walk-in for migraine cocktail. will also Rx imitrex to help as needed.   · Iron deficiency     280.9/E61.1  check iron levels    Problems Reconciled  Plan  · Orders  o ACO-39: Current medications updated and reviewed () - - 07/07/2020  o Iron Profile (Iron, TIBC, and Transferrin) (IRONP) - 280.9/E61.1 - 07/07/2020  o Ferritin ser/plas (21485) - 280.9/E61.1 - 07/07/2020  · Medications  o Imitrex 50 mg oral tablet   SIG: take 1 tablet (50 mg) by oral route after onset of migraine; may repeat after 2 hours if headache returns, not to exceed 200mg in 24hrs   DISP: (30) tablets with 0 refills  Prescribed on 07/07/2020     o Medications have been Reconciled  o Transition of Care or Provider Policy  · Instructions  o Patient was educated/instructed on their diagnosis, treatment and medications prior to discharge from the clinic today.  · Disposition  o f/u in 3 months  o labs done in clinic            Electronically Signed by: Cortez Byrd MD -Author on July 7, 2020 10:49:30 AM

## 2021-05-13 NOTE — PROGRESS NOTES
Progress Note      Patient Name: Josiane Higuera   Patient ID: [id number]   Sex: Female   YOB: 1980    Primary Care Provider: Cortez Byrd MD    Visit Date: November 12, 2020    Provider: Cortez Byrd MD   [place] [facility]   Location Address: [address] [place] [address], [address]  [place], [place]  [id number]   Location Phone: [phone]          Chief Complaint  · Follow Up Headaches      History Of Present Illness  Video Conferencing Visit  Josiane Higuera is a 40 year old /White female who is presenting for evaluation via video conferencing via [employer]. Verbal consent obtained before beginning visit.   The following staff were present during this visit: Dr. Byrd and pt   Josiane Higuera is a 40 year old /White female who presents for evaluation and treatment of:      anxiety- pt reports filing for divorce recently. pt reports volatile marriage and anxiety surround kids. pt thinks buspar previously caused palpitations. pt reports hydroxyzine makes her sleepy. pt reports having a safety plan inplace after seeing counselor.     insomnia- pt reports trazadone is helping with her sleep.  migraine- pt reports worsened recently as she tried to wean off Topamax. pt has used 4 doses of Imitrex in last 2 weeks. pt thinks inc stress may be a trigger.      paresthesias- pt reports persistent. pt also with intermittent cramps in hands. pt did start folate supplement.       Past Medical History  Disease Name Date Onset Notes   Allergic rhinitis --  --    Anemia --  --    Anemia, Unspecified --  --    Anxiety --  --    Arthritis --  --    B12 deficiency --  --    Broken Bones --  --    Gastric bypass status for obesity --  --    GERD (gastroesophageal reflux disease) --  --    Hemorrhoids --  During pregnancy   High blood pressure --  During pregnancy   Migraine headache --  --    Reflux --  --    Seasonal allergies --  --          Past Surgical History  Procedure Name Date  "Notes   Appendectomy --  --    Artificial Joints/Limbs --  --    bowel obstruction 2010 Scar tissue from bypass. Dr. Salinas as MD on case.   Ceasarian section 2015 & 2018 --    Cesarian Section --  --    Colonoscopy --  --    Gallbladder --  --    Gastric Bypass 2009 --    Joint Surgery --  --    Metal implants --  --    Tonsillectomy --  --    Tonsillectomy and adenoidectomy in patient age 12 or over --  --    Total hip replacement 2014 Right hip         Medication List  Name Date Started Instructions   cyanocobalamin (vitamin B-12) 1,000 mcg/mL injection solution 06/29/2020 INJECT 1 ML INTRAMUSCULARLY ONCE WEEKLY FOR 6 WEEKS   escitalopram oxalate 20 mg oral tablet 09/21/2020 TAKE 1 TABLET BY MOUTH EVERY DAY   folic acid 1 mg oral tablet  take 1 tablet (1 mg) by oral route once daily   gabapentin 600 mg oral tablet  take 1 tablet (600 mg) by oral route 3 times per day   Imitrex 50 mg oral tablet 09/21/2020 take 1 tablet (50 mg) by oral route after onset of migraine; may repeat after 2 hours if headache returns, not to exceed 200mg in 24hrs   levocetirizine 5 mg oral tablet  take 1 tablet (5 mg) by oral route once daily in the evening   methocarbamol 750 mg oral tablet  take 1 tablet (750 mg) by oral route 3 times per day   omeprazole 20 mg oral capsule,delayed release(/EC) 07/13/2020 take 1 capsule (20 mg) by oral route once daily before a meal for 90 days   oxycodone 5 mg oral capsule  take 1 capsule (5 mg) by oral route every 6 hours as needed for pain   Syringe 3cc/25Gx1\" 3 mL 25 gauge x 1\" miscellaneous syringe 04/23/2020 use as directed once weekly for Vitamin B12 injections due to deficiency   topiramate 100 mg oral tablet 11/05/2020 TAKE 1 TABLET BY MOUTH TWICE A DAY   trazodone 50 mg oral tablet 11/06/2020 take 1 tablet (50 mg) by oral route once daily at bedtime for 30 days   Zofran 4 mg oral tablet 07/14/2020 take 1 tablet by oral route every 4 hours prn nausea         Allergy List  Allergen Name Date " Reaction Notes   aspirin --  --  --    diclofenac sodium --  --  --    Feldene --  --  --    hydrocodone-acetaminophen --  --  --    ibuprofen --  --  --    meloxicam --  --  --    morphine --  --  --    naproxen --  --  --          Family Medical History  Disease Name Relative/Age Notes   Heart Disease Father/   Father  Mother, aunt/uncle   Cancer, Unspecified Father/   Father  Head/Neck Cancer   Lung cancer  Grandparent   Family history of certain chronic disabling diseases; arthritis Mother/   Mother   Osteoporosis Mother/   Mother         Social History  Finding Status Start/Stop Quantity Notes   Alcohol Use Current some day --/-- --  occasionally drinks, less than 1 drink per day, has been drinking for 2 years   Homemaker. --  --/-- --  --    lives with children --  --/-- --  --    lives with spouse --  --/-- --  --    . --  --/-- --  --    Recreational Drug Use Never --/-- --  no   Tobacco Never --/-- --  never smoker         Immunizations  NameDate Admin Mfg Trade Name Lot Number Route Inj VIS Given VIS Publication   Hepatitis A11/16/2018 [employer] HAVRIX-ADULT [id number] IM RD 11/16/2018 07/20/2016   Comments: Tolerated well   Hepatitis A05/08/2018 [facility] HAVRIX-ADULT [id number] IM RD 05/08/2018 10/25/2011   Comments: Pt tolerated well left office in stable condition.  RN   Amuiqoyjs60/12/2020 [facility] Fluzone Quadrivalent [id number] IM LD 10/12/2020 08/15/2019   Comments: Pt tolerated well, left office in stable condition         Review of Systems  · Constitutional  o Denies  o : fever, fatigue, weight loss, weight gain  · HENT  o Admits  o : headaches  o Denies  o : lightheadedness  · Cardiovascular  o Denies  o : lower extremity edema, chest pressure, palpitations  · Respiratory  o Denies  o : shortness of breath, wheezing, frequent cough, dyspnea on exertion  · Gastrointestinal  o Denies  o : nausea, vomiting, diarrhea, constipation, abdominal pain  · Psychiatric  o Admits  o : anxiety  o Denies  o : suicidal ideation,  homicidal ideation      Physical Examination     Gen: well-nourished, no acute distress  HENT: atraumatic, normocephalic  Eyes: extraocular movements intact, no scleral icterus  Lung: breathing comfortably, no cough  Neuro: grossly oriented to person, place, and time. no facial droop   Psych: normal mood and affect             Assessment  · Anxiety     300.02/F41.1  will inc lexapro to 30mg daily and monitor effectiveness. pt understands this is off-label use. f/u in 1 month for repeat eval.   · Migraine headache     346.90/G43.909  cont current dose of Topamax and Imitrex as needed.   · Insomnia, unspecified     780.52/G47.00  improved on trazadone.   · Paresthesia     782.0/R20.2  cont folate and vitamin B12 supplement. may consider EMG if persists up to next visit.     Problems Reconciled  Plan  · Orders  o ACO-39: Current medications updated and reviewed (, 1159F) - - 11/12/2020  · Medications  o escitalopram oxalate 10 mg oral tablet   SIG: take 1 tablet (10 mg) by oral route once daily for 90 days. Take with 20mg tablet for total 30mg daily.   DISP: (90) Tablet with 0 refills  Adjusted on 11/12/2020     o Medications have been Reconciled  o Transition of Care or Provider Policy  · Instructions  o Patient was educated/instructed on their diagnosis, treatment and medications prior to discharge from the clinic today.  · Disposition  o f/u in 1 month            Electronically Signed by: Cortez Byrd MD -Author on November 12, 2020 09:55:33 AM

## 2021-05-14 VITALS
DIASTOLIC BLOOD PRESSURE: 64 MMHG | OXYGEN SATURATION: 99 % | TEMPERATURE: 98 F | BODY MASS INDEX: 29.63 KG/M2 | WEIGHT: 161 LBS | SYSTOLIC BLOOD PRESSURE: 108 MMHG | HEART RATE: 69 BPM | HEIGHT: 62 IN

## 2021-05-14 NOTE — PROGRESS NOTES
"   Progress Note      Patient Name: Josiane Higuera   Patient ID: [id number]   Sex: Female   YOB: 1980    Primary Care Provider: Cortez Byrd MD    Visit Date: December 17, 2020    Provider: Cortez Byrd MD   Location: [facility]   Location Address: [address] [place] [address], [address]  [place], [place]  [id number]   Location Phone: [phone]          Chief Complaint  · Follow Up   · \" following up on propanolol and some other things\"      History Of Present Illness  Video Conferencing Visit  Josiane Higuera is a 40 year old /White female who is presenting for evaluation via video conferencing via [employer]. Verbal consent obtained before beginning visit.   The following staff were present during this visit: Dr. Byrd and pt.   Josiane Higuera is a 40 year old /White female who presents for evaluation and treatment of:      migraines- doing much better. pt wants to wean off Topamax.   insomnia- pt is taking trazadone more often to help with sleeping.    anxiety - pt is off Lexapro. pt thinks propranolol is working better than the Lexapro to help control. pt does report some lightheadedness.       Past Medical History  Disease Name Date Onset Notes   Allergic rhinitis --  --    Anemia --  --    Anemia, Unspecified --  --    Anxiety --  --    Arthritis --  --    B12 deficiency --  --    Broken Bones --  --    Gastric bypass status for obesity --  --    GERD (gastroesophageal reflux disease) --  --    Hemorrhoids --  During pregnancy   High blood pressure --  During pregnancy   Migraine headache --  --    Reflux --  --    Seasonal allergies --  --          Past Surgical History  Procedure Name Date Notes   Appendectomy --  --    Artificial Joints/Limbs --  --    bowel obstruction 2010 Scar tissue from bypass. Dr. Salinas as MD on case.   Ceasarian section 2015 & 2018 --    Cesarian Section --  --    Colonoscopy --  --    Gallbladder --  --    Gastric Bypass 2009 --    Joint " "Surgery --  --    Metal implants --  --    Tonsillectomy --  --    Tonsillectomy and adenoidectomy in patient age 12 or over --  --    Total hip replacement 2014 Right hip         Medication List  Name Date Started Instructions   cyanocobalamin (vitamin B-12) 1,000 mcg/mL injection solution 06/29/2020 INJECT 1 ML INTRAMUSCULARLY ONCE WEEKLY FOR 6 WEEKS   escitalopram oxalate 10 mg oral tablet 11/12/2020 take 1 tablet (10 mg) by oral route once daily for 90 days. Take with 20mg tablet for total 30mg daily.   folic acid 1 mg oral tablet  take 1 tablet (1 mg) by oral route once daily   gabapentin 600 mg oral tablet  take 1 tablet (600 mg) by oral route 3 times per day   Imitrex 50 mg oral tablet 09/21/2020 take 1 tablet (50 mg) by oral route after onset of migraine; may repeat after 2 hours if headache returns, not to exceed 200mg in 24hrs   levocetirizine 5 mg oral tablet  take 1 tablet (5 mg) by oral route once daily in the evening   methocarbamol 750 mg oral tablet  take 1 tablet (750 mg) by oral route 3 times per day   omeprazole 20 mg oral capsule,delayed release(DR/EC) 07/13/2020 take 1 capsule (20 mg) by oral route once daily before a meal for 90 days   oxycodone 5 mg oral capsule  take 1 capsule (5 mg) by oral route every 6 hours as needed for pain   propranolol 20 mg oral tablet 11/25/2020 take 1 tablet by oral route 2 times a day for 30 days   Syringe 3cc/25Gx1\" 3 mL 25 gauge x 1\" miscellaneous syringe 04/23/2020 use as directed once weekly for Vitamin B12 injections due to deficiency   topiramate 100 mg oral tablet 11/05/2020 TAKE 1 TABLET BY MOUTH TWICE A DAY   trazodone 50 mg oral tablet 11/06/2020 take 1 tablet (50 mg) by oral route once daily at bedtime for 30 days   Zofran 4 mg oral tablet 07/14/2020 take 1 tablet by oral route every 4 hours prn nausea         Allergy List  Allergen Name Date Reaction Notes   aspirin --  --  --    diclofenac sodium --  --  --    Feldene --  --  --  "   hydrocodone-acetaminophen --  --  --    ibuprofen --  --  --    meloxicam --  --  --    morphine --  --  --    naproxen --  --  --          Family Medical History  Disease Name Relative/Age Notes   Heart Disease Father/   Father  Mother, aunt/uncle   Cancer, Unspecified Father/   Father  Head/Neck Cancer   Lung cancer  Grandparent   Family history of certain chronic disabling diseases; arthritis Mother/   Mother   Osteoporosis Mother/   Mother         Social History  Finding Status Start/Stop Quantity Notes   Alcohol Use Current some day --/-- --  occasionally drinks, less than 1 drink per day, has been drinking for 2 years   Homemaker. --  --/-- --  --    lives with children --  --/-- --  --    lives with spouse --  --/-- --  --    . --  --/-- --  --    Recreational Drug Use Never --/-- --  no   Tobacco Never --/-- --  never smoker         Immunizations  NameDate Admin Mfg Trade Name Lot Number Route Inj VIS Given VIS Publication   Hepatitis A11/16/2018 [employer] HAVRIX-ADULT [id number] IM RD 11/16/2018 07/20/2016   Comments: Tolerated well   Hepatitis A05/08/2018 [facility] HAVRIX-ADULT [id number] IM RD 05/08/2018 10/25/2011   Comments: Pt tolerated well left office in stable condition. CH RN   Yoapxypxo81/12/2020 [facility] Fluzone Quadrivalent [id number] IM LD 10/12/2020 08/15/2019   Comments: Pt tolerated well, left office in stable condition         Review of Systems  · Constitutional  o Denies  o : fever, fatigue, weight loss, weight gain  · Cardiovascular  o Denies  o : lower extremity edema, chest pressure, palpitations  · Respiratory  o Denies  o : shortness of breath, wheezing, frequent cough, dyspnea on exertion  · Gastrointestinal  o Denies  o : nausea, vomiting, diarrhea, constipation, abdominal pain  · Psychiatric  o Admits  o : anxiety  o Denies  o : suicidal ideation, homicidal ideation      Physical Examination     Gen: well-nourished, no acute distress  HENT: atraumatic, normocephalic  Eyes: extraocular movements  intact, no scleral icterus  Lung: breathing comfortably, no cough  Neuro: grossly oriented to person, place, and time. no facial droop   Psych: normal mood and affect             Assessment  · Anxiety     300.02/F41.1  cont propanolo. off Lexapro. cont f/u with psych.   · Migraine headache     346.90/G43.909  doing well. wean Topamax to 150mg x2 weeks and then 100mg daily. f/u in1 month for eval.   · Insomnia, unspecified     780.52/G47.00  cont trazadone as needed.     Problems Reconciled  Plan  · Orders  o ACO-39: Current medications updated and reviewed (1159F, ) - - 12/17/2020  · Medications  o propranolol 40 mg oral tablet   SIG: take 1 tablet (40 mg) by oral route 2 times per day for 30 days   DISP: (60) Tablet with 1 refills  Adjusted on 12/17/2020     o topiramate 50 mg oral tablet   SIG: take 1 tablet (50 mg) by oral route 2 times per day for 30 days   DISP: (60) Tablet with 0 refills  Adjusted on 12/17/2020     o escitalopram oxalate 10 mg oral tablet   SIG: take 1 tablet (10 mg) by oral route once daily for 90 days. Take with 20mg tablet for total 30mg daily.   DISP: (90) Tablet with 0 refills  Discontinued on 12/17/2020     o Medications have been Reconciled  o Transition of Care or Provider Policy  · Instructions  o Patient was educated/instructed on their diagnosis, treatment and medications prior to discharge from the clinic today.  · Disposition  o f/u in 1 month            Electronically Signed by: Cortez Byrd MD -Author on December 17, 2020 02:58:34 PM

## 2021-05-14 NOTE — PROGRESS NOTES
Progress Note      Patient Name: Josiane Higuera   Patient ID: [id number]   Sex: Female   YOB: 1980    Primary Care Provider: Cortez Byrd MD    Visit Date: January 15, 2021    Provider: Cortez Byrd MD   Location: [facility]   Location Address: [address] [place] [address], [facility]  [place], [place]  [id number]   Location Phone: [phone]          Chief Complaint  · Follow up for headaches      History Of Present Illness  Video Conferencing Visit  Josiane Higuera is a 40 year old /White female who is presenting for evaluation via video conferencing via [employer]. Verbal consent obtained before beginning visit.   The following staff were present during this visit: Dr. Byrd and pt   Josiane Higuera is a 40 year old /White female who presents for evaluation and treatment of:      migraine- pt is still on Topamax 150mg BID currently. pt reports more frequent migraines recently. pt also taking imitrex approx. every other day. pt has tried magnesium and coenzyme q10.  anxiety - pt reports propranolol helps, but cont to have intermittent panic attacks. pt reports having palpitations with buspar. pt has been able to stay off trazadone. pt no longer on Lexapro with concerns for weight gain. pt denies HI and SI. pt does not want to be on benzodiazepines.       Allergy List    Allergies Reconciled  Physical Examination     Gen: well-nourished, no acute distress  HENT: atraumatic, normocephalic  Eyes: extraocular movements intact, no scleral icterus  Lung: breathing comfortably, no cough  Neuro: grossly oriented to person, place, and time. no facial droop   Psych: normal mood and affect             Assessment  · Migraine headache     346.90/G43.909  will start emgality and monitor for effectiveness. pt to f/u in 2-3 months for repeat eval.   · Anxiety disorder     300.00/F41.9  cont propranaol. limited other options given history of side effects with other meds. pt to check BP  and HR for 1 week and return call. may consider inc propranolol dose based on numbers.     Problems Reconciled  Plan  · Orders  o ACO-39: Current medications updated and reviewed (1159F, ) - - 01/15/2021  · Medications  o Emgality Pen 120 mg/mL subcutaneous pen injector   SIG: inject 2mL (240 mg) as 2 consecutive 120mg injections by SubQ once (using 2 separate injection sites)   DISP: (2) Milliliter with 0 refills  Prescribed on 01/15/2021     o Medications have been Reconciled  o Transition of Care or Provider Policy  · Instructions  o Patient was educated/instructed on their diagnosis, treatment and medications prior to discharge from the clinic today.  · Disposition  o f/u in 3 months            Electronically Signed by: Cortez Byrd MD -Author on January 15, 2021 03:18:51 PM

## 2021-05-15 VITALS
BODY MASS INDEX: 31.83 KG/M2 | OXYGEN SATURATION: 99 % | HEART RATE: 74 BPM | RESPIRATION RATE: 14 BRPM | WEIGHT: 173 LBS | SYSTOLIC BLOOD PRESSURE: 124 MMHG | DIASTOLIC BLOOD PRESSURE: 80 MMHG | TEMPERATURE: 98.2 F | HEIGHT: 62 IN

## 2021-05-15 VITALS
WEIGHT: 172 LBS | RESPIRATION RATE: 15 BRPM | TEMPERATURE: 98.1 F | HEIGHT: 62 IN | SYSTOLIC BLOOD PRESSURE: 120 MMHG | BODY MASS INDEX: 31.65 KG/M2 | HEART RATE: 73 BPM | DIASTOLIC BLOOD PRESSURE: 82 MMHG | OXYGEN SATURATION: 98 %

## 2021-05-15 VITALS — HEIGHT: 62 IN | WEIGHT: 161.5 LBS | OXYGEN SATURATION: 97 % | BODY MASS INDEX: 29.72 KG/M2 | HEART RATE: 67 BPM

## 2021-05-15 VITALS
DIASTOLIC BLOOD PRESSURE: 80 MMHG | HEART RATE: 69 BPM | TEMPERATURE: 97.8 F | WEIGHT: 164.37 LBS | HEIGHT: 62 IN | SYSTOLIC BLOOD PRESSURE: 118 MMHG | BODY MASS INDEX: 30.25 KG/M2 | OXYGEN SATURATION: 99 %

## 2021-05-15 VITALS — OXYGEN SATURATION: 97 % | HEIGHT: 62 IN | WEIGHT: 161 LBS | HEART RATE: 78 BPM | BODY MASS INDEX: 29.63 KG/M2

## 2021-05-15 VITALS
TEMPERATURE: 97.5 F | OXYGEN SATURATION: 100 % | WEIGHT: 174.12 LBS | BODY MASS INDEX: 32.04 KG/M2 | HEART RATE: 65 BPM | RESPIRATION RATE: 16 BRPM | HEIGHT: 62 IN | SYSTOLIC BLOOD PRESSURE: 116 MMHG | DIASTOLIC BLOOD PRESSURE: 74 MMHG

## 2021-05-15 VITALS
SYSTOLIC BLOOD PRESSURE: 126 MMHG | HEART RATE: 91 BPM | DIASTOLIC BLOOD PRESSURE: 70 MMHG | WEIGHT: 170 LBS | BODY MASS INDEX: 31.28 KG/M2 | HEIGHT: 62 IN | OXYGEN SATURATION: 98 % | TEMPERATURE: 98.3 F

## 2021-05-16 VITALS
WEIGHT: 161.5 LBS | SYSTOLIC BLOOD PRESSURE: 102 MMHG | TEMPERATURE: 97.6 F | DIASTOLIC BLOOD PRESSURE: 72 MMHG | RESPIRATION RATE: 16 BRPM | BODY MASS INDEX: 29.72 KG/M2 | OXYGEN SATURATION: 99 % | HEART RATE: 73 BPM | HEIGHT: 62 IN

## 2021-05-16 VITALS
HEART RATE: 45 BPM | BODY MASS INDEX: 33.49 KG/M2 | SYSTOLIC BLOOD PRESSURE: 112 MMHG | TEMPERATURE: 98.2 F | HEIGHT: 62 IN | DIASTOLIC BLOOD PRESSURE: 78 MMHG | WEIGHT: 182 LBS | OXYGEN SATURATION: 100 % | RESPIRATION RATE: 16 BRPM

## 2021-05-16 VITALS — BODY MASS INDEX: 29.63 KG/M2 | HEART RATE: 62 BPM | OXYGEN SATURATION: 98 % | HEIGHT: 62 IN | WEIGHT: 161 LBS

## 2021-05-16 VITALS — HEIGHT: 62 IN | BODY MASS INDEX: 30.55 KG/M2 | HEART RATE: 69 BPM | OXYGEN SATURATION: 95 % | WEIGHT: 166 LBS

## 2021-05-16 VITALS — WEIGHT: 166 LBS | OXYGEN SATURATION: 97 % | HEIGHT: 62 IN | BODY MASS INDEX: 30.55 KG/M2 | HEART RATE: 72 BPM

## 2021-05-28 VITALS
HEART RATE: 108 BPM | HEIGHT: 63 IN | SYSTOLIC BLOOD PRESSURE: 107 MMHG | BODY MASS INDEX: 32.97 KG/M2 | DIASTOLIC BLOOD PRESSURE: 61 MMHG | WEIGHT: 186.07 LBS | OXYGEN SATURATION: 98 % | TEMPERATURE: 97.6 F

## 2021-05-28 VITALS
SYSTOLIC BLOOD PRESSURE: 117 MMHG | WEIGHT: 165.57 LBS | BODY MASS INDEX: 29.34 KG/M2 | HEIGHT: 63 IN | WEIGHT: 161.6 LBS | BODY MASS INDEX: 28.63 KG/M2 | DIASTOLIC BLOOD PRESSURE: 71 MMHG | RESPIRATION RATE: 16 BRPM | OXYGEN SATURATION: 97 % | HEART RATE: 77 BPM | HEIGHT: 63 IN | SYSTOLIC BLOOD PRESSURE: 105 MMHG | DIASTOLIC BLOOD PRESSURE: 71 MMHG | RESPIRATION RATE: 16 BRPM | OXYGEN SATURATION: 98 % | HEART RATE: 70 BPM | TEMPERATURE: 98 F | TEMPERATURE: 98.3 F
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
## 2021-09-16 ENCOUNTER — OFFICE VISIT (OUTPATIENT)
Dept: INTERNAL MEDICINE | Facility: CLINIC | Age: 41
End: 2021-09-16

## 2021-09-16 VITALS
HEART RATE: 81 BPM | BODY MASS INDEX: 27.44 KG/M2 | DIASTOLIC BLOOD PRESSURE: 78 MMHG | OXYGEN SATURATION: 98 % | WEIGHT: 149.13 LBS | SYSTOLIC BLOOD PRESSURE: 118 MMHG | HEIGHT: 62 IN | TEMPERATURE: 98.4 F

## 2021-09-16 DIAGNOSIS — R20.2 PARESTHESIA: Chronic | ICD-10-CM

## 2021-09-16 DIAGNOSIS — M54.2 NECK PAIN: Primary | ICD-10-CM

## 2021-09-16 DIAGNOSIS — G43.909 MIGRAINE WITHOUT STATUS MIGRAINOSUS, NOT INTRACTABLE, UNSPECIFIED MIGRAINE TYPE: ICD-10-CM

## 2021-09-16 DIAGNOSIS — R39.89 BLADDER PAIN: ICD-10-CM

## 2021-09-16 PROBLEM — J30.9 ALLERGIC RHINITIS: Status: ACTIVE | Noted: 2021-09-16

## 2021-09-16 LAB
BILIRUB UR QL STRIP: NEGATIVE
CLARITY UR: CLEAR
COLOR UR: YELLOW
GLUCOSE UR STRIP-MCNC: NEGATIVE MG/DL
HGB UR QL STRIP.AUTO: NEGATIVE
KETONES UR QL STRIP: NEGATIVE
LEUKOCYTE ESTERASE UR QL STRIP.AUTO: NEGATIVE
NITRITE UR QL STRIP: NEGATIVE
PH UR STRIP.AUTO: 5.5 [PH] (ref 5–8)
PROT UR QL STRIP: NEGATIVE
SP GR UR STRIP: 1.01 (ref 1–1.03)
UROBILINOGEN UR QL STRIP: NORMAL

## 2021-09-16 PROCEDURE — 96372 THER/PROPH/DIAG INJ SC/IM: CPT | Performed by: NURSE PRACTITIONER

## 2021-09-16 PROCEDURE — 99214 OFFICE O/P EST MOD 30 MIN: CPT | Performed by: NURSE PRACTITIONER

## 2021-09-16 PROCEDURE — 81003 URINALYSIS AUTO W/O SCOPE: CPT | Performed by: NURSE PRACTITIONER

## 2021-09-16 RX ORDER — METHYLPREDNISOLONE ACETATE 80 MG/ML
80 INJECTION, SUSPENSION INTRA-ARTICULAR; INTRALESIONAL; INTRAMUSCULAR; SOFT TISSUE ONCE
Status: COMPLETED | OUTPATIENT
Start: 2021-09-16 | End: 2021-09-16

## 2021-09-16 RX ORDER — GABAPENTIN 600 MG/1
600 TABLET ORAL 3 TIMES DAILY
COMMUNITY
Start: 2021-07-10

## 2021-09-16 RX ORDER — PROPRANOLOL HYDROCHLORIDE 40 MG/1
1 TABLET ORAL 2 TIMES DAILY
COMMUNITY
Start: 2021-03-08 | End: 2021-11-11

## 2021-09-16 RX ORDER — FOLIC ACID 1 MG/1
TABLET ORAL
COMMUNITY
End: 2022-02-16

## 2021-09-16 RX ORDER — OMEPRAZOLE 20 MG/1
1 CAPSULE, DELAYED RELEASE ORAL DAILY
COMMUNITY
Start: 2021-02-12 | End: 2021-11-11

## 2021-09-16 RX ORDER — CYANOCOBALAMIN 1000 UG/ML
1000 INJECTION, SOLUTION INTRAMUSCULAR; SUBCUTANEOUS
COMMUNITY
End: 2022-12-13

## 2021-09-16 RX ORDER — RIMEGEPANT SULFATE 75 MG/75MG
TABLET, ORALLY DISINTEGRATING ORAL
COMMUNITY
Start: 2021-02-12 | End: 2022-02-01

## 2021-09-16 RX ORDER — OXYCODONE HYDROCHLORIDE AND ACETAMINOPHEN 5; 325 MG/1; MG/1
TABLET ORAL
COMMUNITY
Start: 2021-09-05 | End: 2022-03-02

## 2021-09-16 RX ORDER — METHOCARBAMOL 750 MG/1
750 TABLET, FILM COATED ORAL 3 TIMES DAILY PRN
COMMUNITY
Start: 2021-08-23 | End: 2021-09-19

## 2021-09-16 RX ORDER — LEVOCETIRIZINE DIHYDROCHLORIDE 5 MG/1
5 TABLET, FILM COATED ORAL DAILY
COMMUNITY
End: 2022-05-10

## 2021-09-16 RX ADMIN — METHYLPREDNISOLONE ACETATE 80 MG: 80 INJECTION, SUSPENSION INTRA-ARTICULAR; INTRALESIONAL; INTRAMUSCULAR; SOFT TISSUE at 16:25

## 2021-09-16 NOTE — ASSESSMENT & PLAN NOTE
UA in the office. Counseled on avoidance of nsaids given allergy and excessive use as this could damage kidneys

## 2021-09-16 NOTE — ASSESSMENT & PLAN NOTE
Cervical xray in the office. Will likely proceed with MRI given progressive worsening of pain and paresthesia.

## 2021-09-16 NOTE — PATIENT INSTRUCTIONS
Paresthesia  Paresthesia is an abnormal burning or prickling sensation. It is usually felt in the hands, arms, legs, or feet. However, it may occur in any part of the body. Usually, paresthesia is not painful. It may feel like:  · Tingling or numbness.  · Buzzing.  · Itching.  Paresthesia may occur without any clear cause, or it may be caused by:  · Breathing too quickly (hyperventilation).  · Pressure on a nerve.  · An underlying medical condition.  · Side effects of a medication.  · Nutritional deficiencies.  · Exposure to toxic chemicals.  Most people experience temporary (transient) paresthesia at some time in their lives. For some people, it may be long-lasting (chronic) because of an underlying medical condition. If you have paresthesia that lasts a long time, you may need to be evaluated by your health care provider.  Follow these instructions at home:  Alcohol use    · Do not drink alcohol if:  ? Your health care provider tells you not to drink.  ? You are pregnant, may be pregnant, or are planning to become pregnant.  · If you drink alcohol:  ? Limit how much you use to:  § 0-1 drink a day for women.  § 0-2 drinks a day for men.  ? Be aware of how much alcohol is in your drink. In the [place] one drink equals one 12 oz bottle of beer (355 mL), one 5 oz glass of wine (148 mL), or one 1½ oz glass of hard liquor (44 mL).    Nutrition    · Eat a healthy diet. This includes:  ? Eating foods that are high in fiber, such as fresh fruits and vegetables, whole grains, and beans.  ? Limiting foods that are high in fat and processed sugars, such as fried or sweet foods.    General instructions  · Take over-the-counter and prescription medicines only as told by your health care provider.  · Do not use any products that contain nicotine or tobacco, such as cigarettes and e-cigarettes. These can keep blood from reaching damaged nerves. If you need help quitting, ask your health care provider.  · If you have diabetes, work  closely with your health care provider to keep your blood sugar under control.  · If you have numbness in your feet:  ? Check every day for signs of injury or infection. Watch for redness, warmth, and swelling.  ? Wear padded socks and comfortable shoes. These help protect your feet.  · Keep all follow-up visits as told by your health care provider. This is important.  Contact a health care provider if you:  · Have paresthesia that gets worse or does not go away.  · Have a burning or prickling feeling that gets worse when you walk.  · Have pain, cramps, or dizziness.  · Develop a rash.  Get help right away if you:  · Feel weak.  · Have trouble walking or moving.  · Have problems with speech, understanding, or vision.  · Feel confused.  · Cannot control your bladder or bowel movements.  · Have numbness after an injury.  · Develop new weakness in an arm or leg.  · Faint.  Summary  · Paresthesia is an abnormal burning or prickling sensation that is usually felt in the hands, arms, legs, or feet. It may also occur in other parts of the body.  · Paresthesia may occur without any clear cause, or it may be caused by breathing too quickly (hyperventilation), pressure on a nerve, an underlying medical condition, side effects of a medication, nutritional deficiencies, or exposure to toxic chemicals.  · If you have paresthesia that lasts a long time, you may need to be evaluated by your health care provider.  This information is not intended to replace advice given to you by your health care provider. Make sure you discuss any questions you have with your health care provider.  Document Revised: 01/13/2020 Document Reviewed: 12/27/2018  Elsevier Patient Education © 2021 [employer]    Cervical Radiculopathy    Cervical radiculopathy happens when a nerve in the neck (a cervical nerve) is pinched or bruised. This condition can happen because of an injury to the cervical spine (vertebrae) in the neck, or as part of the normal  aging process. Pressure on the cervical nerves can cause pain or numbness that travels from the neck all the way down into the arm and fingers. Usually, this condition gets better with rest. Treatment may be needed if the condition does not improve.  What are the causes?  This condition may be caused by:  · A neck injury.  · A bulging (herniated) disk.  · Muscle spasms.  · Muscle tightness in the neck because of overuse.  · Arthritis.  · Breakdown or degeneration in the bones and joints of the spine (spondylosis) due to aging.  · Bone spurs that may develop near the cervical nerves.  What are the signs or symptoms?  Symptoms of this condition include:  · Pain. The pain may travel from the neck to the arm and hand. The pain can be severe or irritating. It may be worse when you move your neck.  · Numbness or tingling in your arm or hand.  · Weakness in the affected arm and hand, in severe cases.  How is this diagnosed?  This condition may be diagnosed based on your symptoms, your medical history, and a physical exam. You may also have tests, including:  · X-rays.  · A CT scan.  · An MRI.  · An electromyogram (EMG).  · Nerve conduction tests.  How is this treated?  In many cases, treatment is not needed for this condition. With rest, the condition usually gets better over time. If treatment is needed, options may include:  · Wearing a soft neck collar (cervical collar) for short periods of time, as told by your health care provider.  · Doing physical therapy to strengthen your neck muscles.  · Taking medicines, such as NSAIDs or oral corticosteroids.  · Having spinal injections, in severe cases.  · Having surgery. This may be needed if other treatments do not help. Different types of surgery may be done depending on the cause of this condition.  Follow these instructions at home:  If you have a cervical collar:  · Wear it as told by your health care provider. Remove it only as told by your health care provider.  · Ask  your health care provider if you can remove the collar for cleaning and bathing. If you are allowed to remove the collar for cleaning or bathing:  ? Follow instructions from your health care provider about how to remove the collar safely.  ? Clean the collar by wiping it with mild soap and water and drying it completely.  ? Take out any removable pads in the collar every 1-2 days, and wash them by hand with soap and water. Let them air-dry completely before you put them back in the collar.  ? Check your skin under the collar for irritation or sores. If you see any, tell your health care provider.  Managing pain         · Take over-the-counter and prescription medicines only as told by your health care provider.  · If directed, put ice on the affected area.  ? If you have a soft neck collar, remove it as told by your health care provider.  ? Put ice in a plastic bag.  ? Place a towel between your skin and the bag.  ? Leave the ice on for 20 minutes, 2-3 times a day.  · If applying ice does not help, you can try using heat. Use the heat source that your health care provider recommends, such as a moist heat pack or a heating pad.  ? Place a towel between your skin and the heat source.  ? Leave the heat on for 20-30 minutes.  ? Remove the heat if your skin turns bright red. This is especially important if you are unable to feel pain, heat, or cold. You may have a greater risk of getting burned.  · Try a gentle neck and shoulder massage to help relieve symptoms.  Activity  · Rest as needed.  · Return to your normal activities as told by your health care provider. Ask your health care provider what activities are safe for you.  · Do stretching and strengthening exercises as told by your health care provider or physical therapist.  · Do not lift anything that is heavier than 10 lb (4.5 kg) until your health care provider tells you that it is safe.  General instructions  · Use a flat pillow when you sleep.  · Do not drive  while wearing a cervical collar. If you do not have a cervical collar, ask your health care provider if it is safe to drive while your neck heals.  · Ask your health care provider if the medicine prescribed to you requires you to avoid driving or using heavy machinery.  · Do not use any products that contain nicotine or tobacco, such as cigarettes, e-cigarettes, and chewing tobacco. These can delay healing. If you need help quitting, ask your health care provider.  · Keep all follow-up visits as told by your health care provider. This is important.  Contact a health care provider if:  · Your condition does not improve with treatment.  Get help right away if:  · Your pain gets much worse and cannot be controlled with medicines.  · You have weakness or numbness in your hand, arm, face, or leg.  · You have a high fever.  · You have a stiff, rigid neck.  · You lose control of your bowels or your bladder (have incontinence).  · You have trouble with walking, balance, or speaking.  Summary  · Cervical radiculopathy happens when a nerve in the neck is pinched or bruised.  · A nerve can get pinched from a bulging disk, arthritis, muscle spasms, or an injury to the neck.  · Symptoms include pain, tingling, or numbness radiating from the neck into the arm or hand. Weakness can also occur in severe cases.  · Treatment may include rest, wearing a cervical collar, and physical therapy. Medicines may be prescribed to help with pain. In severe cases, injections or surgery may be needed.  This information is not intended to replace advice given to you by your health care provider. Make sure you discuss any questions you have with your health care provider.  Document Revised: 11/08/2019 Document Reviewed: 11/08/2019  Elsevier Patient Education © 2021 [employer]

## 2021-09-16 NOTE — PROGRESS NOTES
"Chief Complaint  Neck Pain (has progressively gotten worse, feels like constant pressure, has caused headaches, has tried Migraine medicine which has not helped, has even been taking Ibuprofen (6, multiple times daily)) and Numbness (in hands and feet)    Subjective          Josiane Higuera presents to [facility]  History of Present Illness    She has been having neck pain in the last year with wosening in the last few months. She reports feeling pressure and popping  She reports headaches with her neck pain.  She is having numbness and tingling in hands and feet.   She did talk to pain management about this.  Has been off topamax for months without migraine type headaches  She tried rescue meds for migraines without relief of recent headaches  She also reports taking motrin 1200mg sometimes 6x per day with benadryl.  She also reports pain in bladder in the last few days.     Objective   Vital Signs:   /78   Pulse 81   Temp 98.4 °F (36.9 °C) (Temporal)   Ht 157.5 cm (62\")   Wt 67.6 kg (149 lb 2 oz)   SpO2 98%   BMI 27.28 kg/m²     Physical Exam  Vitals and nursing note reviewed.   Constitutional:       General: She is not in acute distress.     Appearance: Normal appearance.   HENT:      Head: Normocephalic and atraumatic.      Right Ear: External ear normal.      Left Ear: External ear normal.      Nose: Nose normal.      Mouth/Throat:      Mouth: Mucous membranes are moist.   Eyes:      Conjunctiva/sclera: Conjunctivae normal.   Neck:     Cardiovascular:      Rate and Rhythm: Normal rate and regular rhythm.      Pulses: Normal pulses.      Heart sounds: Normal heart sounds. No murmur heard.   No friction rub. No gallop.    Pulmonary:      Effort: Pulmonary effort is normal. No respiratory distress.      Breath sounds: No wheezing, rhonchi or rales.   Musculoskeletal:      Cervical back: Rigidity and crepitus present. No edema, erythema, signs of trauma or " torticollis. Pain with movement present. Normal range of motion.   Skin:     General: Skin is warm and dry.   Neurological:      General: No focal deficit present.      Mental Status: She is alert and oriented to person, place, and time.      Sensory: No sensory deficit.      Motor: No weakness.      Gait: Gait normal.   Psychiatric:         Mood and Affect: Mood normal.         Behavior: Behavior normal.        Result Review :          Procedures      Assessment and Plan    Diagnoses and all orders for this visit:    1. Neck pain (Primary)  Comments:  will give depomedrol 80mg IM in the office today  Assessment & Plan:  Cervical xray in the office. Will likely proceed with MRI given progressive worsening of pain and paresthesia.    Orders:  -     XR Spine Cervical Complete 4 or 5 View  -     Urinalysis With Culture If Indicated - Urine, Clean Catch  -     methylPREDNISolone acetate (DEPO-medrol) injection 80 mg    2. Migraine without status migrainosus, not intractable, unspecified migraine type    3. Paresthesia  Assessment & Plan:  Of hand likely 2/2 neck etiology      4. Bladder pain  Assessment & Plan:  UA in the office. Counseled on avoidance of nsaids given allergy and excessive use as this could damage kidneys      Other orders  -     Cancel: POCT urinalysis dipstick, automated            Follow Up   Return if symptoms worsen or fail to improve.  Patient was given instructions and counseling regarding her condition or for health maintenance advice. Please see specific information pulled into the AVS if appropriate.

## 2021-09-17 DIAGNOSIS — M54.2 CERVICAL PAIN: Primary | ICD-10-CM

## 2021-09-17 NOTE — TELEPHONE ENCOUNTER
Patient called where she saw the results on my chart. The medication tizanidine that you wanted filled needs a quantity. Please fill in quantity and send med. Also MRI cervical spine ordered in this task ready for your completion.

## 2021-09-19 RX ORDER — TIZANIDINE 2 MG/1
2 TABLET ORAL EVERY 8 HOURS PRN
Qty: 90 TABLET | Refills: 1 | Status: SHIPPED | OUTPATIENT
Start: 2021-09-19 | End: 2021-10-11

## 2021-10-05 ENCOUNTER — APPOINTMENT (OUTPATIENT)
Dept: MRI IMAGING | Facility: HOSPITAL | Age: 41
End: 2021-10-05

## 2021-10-06 ENCOUNTER — HOSPITAL ENCOUNTER (OUTPATIENT)
Dept: MRI IMAGING | Facility: HOSPITAL | Age: 41
Discharge: HOME | End: 2021-10-06
Admitting: NURSE PRACTITIONER

## 2021-10-06 DIAGNOSIS — M54.2 CERVICAL PAIN: ICD-10-CM

## 2021-10-06 PROCEDURE — 72141 MRI NECK SPINE W/O DYE: CPT

## 2021-10-08 DIAGNOSIS — M54.2 CERVICAL PAIN: Primary | ICD-10-CM

## 2021-10-11 RX ORDER — TIZANIDINE 2 MG/1
2 TABLET ORAL EVERY 8 HOURS PRN
Qty: 90 TABLET | Refills: 1 | Status: SHIPPED | OUTPATIENT
Start: 2021-10-11 | End: 2021-12-23

## 2021-11-11 ENCOUNTER — OFFICE VISIT (OUTPATIENT)
Dept: INTERNAL MEDICINE | Facility: CLINIC | Age: 41
End: 2021-11-11

## 2021-11-11 VITALS
HEIGHT: 62 IN | WEIGHT: 144.38 LBS | BODY MASS INDEX: 26.57 KG/M2 | SYSTOLIC BLOOD PRESSURE: 118 MMHG | TEMPERATURE: 98 F | OXYGEN SATURATION: 100 % | DIASTOLIC BLOOD PRESSURE: 74 MMHG | HEART RATE: 55 BPM

## 2021-11-11 DIAGNOSIS — J18.9 PNEUMONIA OF RIGHT MIDDLE LOBE DUE TO INFECTIOUS ORGANISM: ICD-10-CM

## 2021-11-11 DIAGNOSIS — R05.9 COUGH: Primary | ICD-10-CM

## 2021-11-11 PROCEDURE — 99214 OFFICE O/P EST MOD 30 MIN: CPT | Performed by: NURSE PRACTITIONER

## 2021-11-11 RX ORDER — METHOCARBAMOL 750 MG/1
1 TABLET, FILM COATED ORAL 3 TIMES DAILY
COMMUNITY
Start: 2021-10-31 | End: 2022-03-02

## 2021-11-11 RX ORDER — ALBUTEROL SULFATE 90 UG/1
2 AEROSOL, METERED RESPIRATORY (INHALATION) EVERY 4 HOURS PRN
Qty: 12 G | Refills: 0 | Status: SHIPPED | OUTPATIENT
Start: 2021-11-11 | End: 2021-12-04

## 2021-11-11 RX ORDER — DOXYCYCLINE HYCLATE 100 MG/1
100 CAPSULE ORAL 2 TIMES DAILY
Qty: 20 CAPSULE | Refills: 0 | Status: SHIPPED | OUTPATIENT
Start: 2021-11-11 | End: 2022-01-04

## 2021-11-11 NOTE — ASSESSMENT & PLAN NOTE
Treating her for pneumonia based on exam findings.  Will treat with doxycycline x10 days.  Albuterol inhaler as needed.  If symptoms worsen or do not improve after 3 days on antibiotics, she will call for reevaluation or seek treatment urgently with respiratory distress

## 2021-11-11 NOTE — PROGRESS NOTES
"Chief Complaint  Green Mucus (says this sometimes is brown, feels like her lungs are \"sore\" and it is harder to breathe) and Cough (started around fall break, went away and has come back worse)    Subjective          Josiane Higuera presents to [facility]  History of Present Illness  She reports cough, congestion for several days about 4-5 wks ago. She reports cough never went away but had improved. She reports worsening sx in the last 4-5 days  She reports a metallic taste when coughing of sputum. Sputum in green and brown. She denies kiddos having recent symptoms. Denies   Objective   Vital Signs:   /74   Pulse 55   Temp 98 °F (36.7 °C) (Temporal)   Ht 157.5 cm (62\")   Wt 65.5 kg (144 lb 6 oz)   SpO2 100%   BMI 26.41 kg/m²     Physical Exam  Vitals and nursing note reviewed.   Constitutional:       General: She is not in acute distress.     Appearance: Normal appearance.   HENT:      Head: Normocephalic and atraumatic.      Right Ear: External ear normal.      Left Ear: External ear normal.      Nose: Nose normal.      Mouth/Throat:      Mouth: Mucous membranes are moist.   Eyes:      Conjunctiva/sclera: Conjunctivae normal.   Cardiovascular:      Rate and Rhythm: Normal rate and regular rhythm.      Pulses: Normal pulses.      Heart sounds: Normal heart sounds. No murmur heard.  No friction rub. No gallop.    Pulmonary:      Effort: No respiratory distress.      Breath sounds: No wheezing, rhonchi or rales.      Comments: Decreased breath sounds of right middle and upper lung fields  Abdominal:      Palpations: Abdomen is soft.   Musculoskeletal:      Cervical back: Neck supple.   Skin:     General: Skin is warm and dry.   Neurological:      General: No focal deficit present.      Mental Status: She is alert and oriented to person, place, and time.   Psychiatric:         Mood and Affect: Mood normal.         Behavior: Behavior normal.        Result Review " :       Data reviewed: Radiologic studies CXR in office   Procedures      Assessment and Plan    Diagnoses and all orders for this visit:    1. Cough (Primary)  -     XR Chest PA & Lateral (In Office)    2. Pneumonia of right middle lobe due to infectious organism  Assessment & Plan:  Treating her for pneumonia based on exam findings.  Will treat with doxycycline x10 days.  Albuterol inhaler as needed.  If symptoms worsen or do not improve after 3 days on antibiotics, she will call for reevaluation or seek treatment urgently with respiratory distress      Other orders  -     doxycycline (VIBRAMYCIN) 100 MG capsule; Take 1 capsule by mouth 2 (Two) Times a Day.  Dispense: 20 capsule; Refill: 0  -     albuterol sulfate  (90 Base) MCG/ACT inhaler; Inhale 2 puffs Every 4 (Four) Hours As Needed for Wheezing.  Dispense: 12 g; Refill: 0          I spent 35 minutes caring for Josaine on this date of service. This time includes time spent by me in the following activities:obtaining and/or reviewing a separately obtained history, performing a medically appropriate examination and/or evaluation , ordering medications, tests, or procedures, documenting information in the medical record and independently interpreting results and communicating that information with the patient/family/caregiver  Follow Up   No follow-ups on file.  Patient was given instructions and counseling regarding her condition or for health maintenance advice. Please see specific information pulled into the AVS if appropriate.

## 2021-11-18 RX ORDER — TOPIRAMATE 100 MG/1
TABLET, FILM COATED ORAL
Qty: 180 TABLET | Refills: 1 | OUTPATIENT
Start: 2021-11-18

## 2021-11-18 NOTE — TELEPHONE ENCOUNTER
Anticonvulsants Protocol Failed 11/18/2021 12:16 AM   Protocol Details  Active on medication list    No active pregnancy on record    No positive pregnancy test in past 12 months    Visit with authorizing provider in past 12 months or upcoming 30 days    CBC in past 12 months    AST level in past 12 months

## 2021-11-18 NOTE — TELEPHONE ENCOUNTER
Please call and clarify medication with patient.  It is not listed in epic as a medication that she is currently taking.  Upon reviewing Dr. Arenas last note in January of this year, it appears she was taking 150 mg twice daily which is not the dose requested.  Once we confirm the dose as long as its 150 mg twice daily or less, it is okay to send this to the pharmacy.  She takes this for migraines I believe

## 2021-11-19 DIAGNOSIS — G43.909 MIGRAINE WITHOUT STATUS MIGRAINOSUS, NOT INTRACTABLE, UNSPECIFIED MIGRAINE TYPE: Primary | ICD-10-CM

## 2021-11-19 RX ORDER — TOPIRAMATE 100 MG/1
100 TABLET, FILM COATED ORAL 2 TIMES DAILY
Qty: 180 TABLET | Refills: 1 | Status: SHIPPED | OUTPATIENT
Start: 2021-11-19 | End: 2022-02-01 | Stop reason: SDUPTHER

## 2021-11-19 NOTE — TELEPHONE ENCOUNTER
Called and spoke with patient. She clarified that Dr. Arenas gave her two RXs one for 100mg and one for 50mg and told her take both to equal 150mg

## 2021-11-19 NOTE — TELEPHONE ENCOUNTER
Called and spoke with patient and she stated that she is taking a total of 150 mg. Dr. Arenas office gave her a RX for 100mg and a RX for 50mg and told her to take it 2x daily.

## 2021-12-04 RX ORDER — ALBUTEROL SULFATE 90 UG/1
AEROSOL, METERED RESPIRATORY (INHALATION)
Qty: 12 G | Refills: 1 | Status: SHIPPED | OUTPATIENT
Start: 2021-12-04 | End: 2022-02-16

## 2021-12-23 RX ORDER — TIZANIDINE 2 MG/1
TABLET ORAL
Qty: 90 TABLET | Refills: 1 | Status: SHIPPED | OUTPATIENT
Start: 2021-12-23 | End: 2022-02-16

## 2021-12-28 ENCOUNTER — OFFICE VISIT (OUTPATIENT)
Dept: INTERNAL MEDICINE | Facility: CLINIC | Age: 41
End: 2021-12-28

## 2021-12-28 VITALS
TEMPERATURE: 97.8 F | DIASTOLIC BLOOD PRESSURE: 62 MMHG | HEART RATE: 71 BPM | HEIGHT: 62 IN | BODY MASS INDEX: 25.21 KG/M2 | SYSTOLIC BLOOD PRESSURE: 110 MMHG | WEIGHT: 137 LBS | OXYGEN SATURATION: 100 % | RESPIRATION RATE: 16 BRPM

## 2021-12-28 DIAGNOSIS — R63.0 ANOREXIA: Primary | ICD-10-CM

## 2021-12-28 LAB
25(OH)D3 SERPL-MCNC: 22.4 NG/ML
ALBUMIN SERPL-MCNC: 4.1 G/DL (ref 3.5–5.2)
ALBUMIN/GLOB SERPL: 1.5 G/DL
ALP SERPL-CCNC: 73 U/L (ref 39–117)
ALT SERPL W P-5'-P-CCNC: 9 U/L (ref 1–33)
ANION GAP SERPL CALCULATED.3IONS-SCNC: 8.5 MMOL/L (ref 5–15)
AST SERPL-CCNC: 15 U/L (ref 1–32)
BASOPHILS # BLD AUTO: 0.04 10*3/MM3 (ref 0–0.2)
BASOPHILS NFR BLD AUTO: 0.6 % (ref 0–1.5)
BILIRUB SERPL-MCNC: 0.3 MG/DL (ref 0–1.2)
BUN SERPL-MCNC: 11 MG/DL (ref 6–20)
BUN/CREAT SERPL: 12.9 (ref 7–25)
CALCIUM SPEC-SCNC: 9.3 MG/DL (ref 8.6–10.5)
CHLORIDE SERPL-SCNC: 107 MMOL/L (ref 98–107)
CHOLEST SERPL-MCNC: 209 MG/DL (ref 0–200)
CO2 SERPL-SCNC: 21.5 MMOL/L (ref 22–29)
CREAT SERPL-MCNC: 0.85 MG/DL (ref 0.57–1)
DEPRECATED RDW RBC AUTO: 43.3 FL (ref 37–54)
EOSINOPHIL # BLD AUTO: 0.57 10*3/MM3 (ref 0–0.4)
EOSINOPHIL NFR BLD AUTO: 8.3 % (ref 0.3–6.2)
ERYTHROCYTE [DISTWIDTH] IN BLOOD BY AUTOMATED COUNT: 12.1 % (ref 12.3–15.4)
FERRITIN SERPL-MCNC: 99.1 NG/ML (ref 13–150)
FOLATE SERPL-MCNC: 4.47 NG/ML (ref 4.78–24.2)
FOLATE SERPL-MCNC: 4.79 NG/ML (ref 4.78–24.2)
GFR SERPL CREATININE-BSD FRML MDRD: 74 ML/MIN/1.73
GLOBULIN UR ELPH-MCNC: 2.7 GM/DL
GLUCOSE SERPL-MCNC: 78 MG/DL (ref 65–99)
HCT VFR BLD AUTO: 46.7 % (ref 34–46.6)
HDLC SERPL-MCNC: 75 MG/DL (ref 40–60)
HGB BLD-MCNC: 15.8 G/DL (ref 12–15.9)
IMM GRANULOCYTES # BLD AUTO: 0.01 10*3/MM3 (ref 0–0.05)
IMM GRANULOCYTES NFR BLD AUTO: 0.1 % (ref 0–0.5)
IRON 24H UR-MRATE: 193 MCG/DL (ref 37–145)
IRON SATN MFR SERPL: 52 % (ref 20–50)
LDLC SERPL CALC-MCNC: 118 MG/DL (ref 0–100)
LDLC/HDLC SERPL: 1.55 {RATIO}
LYMPHOCYTES # BLD AUTO: 2.03 10*3/MM3 (ref 0.7–3.1)
LYMPHOCYTES NFR BLD AUTO: 29.6 % (ref 19.6–45.3)
MAGNESIUM SERPL-MCNC: 2.3 MG/DL (ref 1.6–2.6)
MCH RBC QN AUTO: 33.1 PG (ref 26.6–33)
MCHC RBC AUTO-ENTMCNC: 33.8 G/DL (ref 31.5–35.7)
MCV RBC AUTO: 97.9 FL (ref 79–97)
MONOCYTES # BLD AUTO: 0.53 10*3/MM3 (ref 0.1–0.9)
MONOCYTES NFR BLD AUTO: 7.7 % (ref 5–12)
NEUTROPHILS NFR BLD AUTO: 3.67 10*3/MM3 (ref 1.7–7)
NEUTROPHILS NFR BLD AUTO: 53.7 % (ref 42.7–76)
NRBC BLD AUTO-RTO: 0 /100 WBC (ref 0–0.2)
PLATELET # BLD AUTO: 411 10*3/MM3 (ref 140–450)
PMV BLD AUTO: 9.6 FL (ref 6–12)
POTASSIUM SERPL-SCNC: 3.9 MMOL/L (ref 3.5–5.2)
PROT SERPL-MCNC: 6.8 G/DL (ref 6–8.5)
RBC # BLD AUTO: 4.77 10*6/MM3 (ref 3.77–5.28)
SODIUM SERPL-SCNC: 137 MMOL/L (ref 136–145)
T4 FREE SERPL-MCNC: 0.92 NG/DL (ref 0.93–1.7)
TIBC SERPL-MCNC: 368 MCG/DL (ref 298–536)
TRANSFERRIN SERPL-MCNC: 247 MG/DL (ref 200–360)
TRIGL SERPL-MCNC: 90 MG/DL (ref 0–150)
TSH SERPL DL<=0.05 MIU/L-ACNC: 1.87 UIU/ML (ref 0.27–4.2)
VIT B12 BLD-MCNC: 244 PG/ML (ref 211–946)
VLDLC SERPL-MCNC: 16 MG/DL (ref 5–40)
WBC NRBC COR # BLD: 6.85 10*3/MM3 (ref 3.4–10.8)

## 2021-12-28 PROCEDURE — 82728 ASSAY OF FERRITIN: CPT | Performed by: PHYSICIAN ASSISTANT

## 2021-12-28 PROCEDURE — 99214 OFFICE O/P EST MOD 30 MIN: CPT | Performed by: PHYSICIAN ASSISTANT

## 2021-12-28 PROCEDURE — 84443 ASSAY THYROID STIM HORMONE: CPT | Performed by: PHYSICIAN ASSISTANT

## 2021-12-28 PROCEDURE — 80053 COMPREHEN METABOLIC PANEL: CPT | Performed by: PHYSICIAN ASSISTANT

## 2021-12-28 PROCEDURE — 84439 ASSAY OF FREE THYROXINE: CPT | Performed by: PHYSICIAN ASSISTANT

## 2021-12-28 PROCEDURE — 82746 ASSAY OF FOLIC ACID SERUM: CPT | Performed by: PHYSICIAN ASSISTANT

## 2021-12-28 PROCEDURE — 80061 LIPID PANEL: CPT | Performed by: PHYSICIAN ASSISTANT

## 2021-12-28 PROCEDURE — 84466 ASSAY OF TRANSFERRIN: CPT | Performed by: PHYSICIAN ASSISTANT

## 2021-12-28 PROCEDURE — 83735 ASSAY OF MAGNESIUM: CPT | Performed by: PHYSICIAN ASSISTANT

## 2021-12-28 PROCEDURE — 83690 ASSAY OF LIPASE: CPT | Performed by: PHYSICIAN ASSISTANT

## 2021-12-28 PROCEDURE — 82607 VITAMIN B-12: CPT | Performed by: PHYSICIAN ASSISTANT

## 2021-12-28 PROCEDURE — 83540 ASSAY OF IRON: CPT | Performed by: PHYSICIAN ASSISTANT

## 2021-12-28 PROCEDURE — 82306 VITAMIN D 25 HYDROXY: CPT | Performed by: PHYSICIAN ASSISTANT

## 2021-12-28 PROCEDURE — 85025 COMPLETE CBC W/AUTO DIFF WBC: CPT | Performed by: PHYSICIAN ASSISTANT

## 2021-12-28 NOTE — PROGRESS NOTES
"Chief Complaint  Anorexia, Weight Loss, Fatigue, Alopecia, and Dizziness    Subjective          Josiane Higuera presents to [facility]  Recently diagnosed with anorexia.  Has a history of gastric bypass 10 years ago.  She has noticed alopecia, fatigue, lightheadedness, shakey, headache.  She does admit to exercising frequently without eating much.  Patient is requesting lab work be checked to see if this could be contributing to any of her symptoms.  She does admit to abdominal pain and cramping which happens mostly after she drinks her protein shakes.  She does admit to increased stress recently with the diagnosis of her son's autism and her father's terminal cancer.  She is interested in starting medication but has tried several before and is not interested in starting anything that could cause weight gain.        Objective   Vital Signs:   /62   Pulse 71   Temp 97.8 °F (36.6 °C) (Temporal)   Resp 16   Ht 157.5 cm (62\")   Wt 62.1 kg (137 lb)   SpO2 100%   BMI 25.06 kg/m²     Physical Exam  Vitals reviewed.   Constitutional:       Appearance: Normal appearance. She is well-developed.   HENT:      Head: Normocephalic and atraumatic.      Right Ear: Tympanic membrane, ear canal and external ear normal.      Left Ear: Tympanic membrane, ear canal and external ear normal.      Mouth/Throat:      Pharynx: No oropharyngeal exudate.   Eyes:      Conjunctiva/sclera: Conjunctivae normal.      Pupils: Pupils are equal, round, and reactive to light.   Cardiovascular:      Rate and Rhythm: Normal rate and regular rhythm.      Heart sounds: No murmur heard.  No friction rub. No gallop.    Pulmonary:      Effort: Pulmonary effort is normal.      Breath sounds: Normal breath sounds. No wheezing or rhonchi.   Abdominal:      General: Bowel sounds are normal. There is no distension.      Palpations: Abdomen is soft.      Tenderness: There is no abdominal tenderness. "   Skin:     General: Skin is warm and dry.   Neurological:      Mental Status: She is alert and oriented to person, place, and time.      Cranial Nerves: No cranial nerve deficit.   Psychiatric:         Mood and Affect: Mood and affect normal.         Behavior: Behavior normal.         Thought Content: Thought content normal.         Judgment: Judgment normal.        Result Review :          Procedures      Assessment and Plan    Diagnoses and all orders for this visit:    1. Anorexia (Primary)  Assessment & Plan:  Will check labs that could be contributing to generalized symptoms, especially with patient's recent anorexia diagnosis and any deficiencies she may experience due to limited food intake.  Abdominal pain likely due to protein supplements so encouraged patient to start eating a variety of foods, especially vegetables and natural proteins (chicken, fish, etc). Will have patient follow up in 1 month for annual exam and to discuss labs results with EDSON Daugherty.  Discussed with patient the importance of management of mental health and will send her to psychiatry to discuss medication options that she may benefit from.  Continue therapy as scheduled.     Orders:  -     Ambulatory Referral to Psychiatry  -     CBC w AUTO Differential  -     Comprehensive metabolic panel  -     TSH  -     T4, free  -     Iron and TIBC  -     Ferritin  -     Vitamin D 25 hydroxy  -     Vitamin B12 & Folate  -     Magnesium  -     Folate  -     Lipid panel  -     Lipase; Future            Follow Up   Return in about 4 weeks (around 1/25/2022).  Patient was given instructions and counseling regarding her condition or for health maintenance advice. Please see specific information pulled into the AVS if appropriate.

## 2021-12-29 LAB — LIPASE SERPL-CCNC: 44 U/L (ref 13–60)

## 2021-12-29 NOTE — ASSESSMENT & PLAN NOTE
Will check labs that could be contributing to generalized symptoms, especially with patient's recent anorexia diagnosis and any deficiencies she may experience due to limited food intake.  Abdominal pain likely due to protein supplements so encouraged patient to start eating a variety of foods, especially vegetables and natural proteins (chicken, fish, etc). Will have patient follow up in 1 month for annual exam and to discuss labs results with EDSON Daugherty.  Discussed with patient the importance of management of mental health and will send her to psychiatry to discuss medication options that she may benefit from.  Continue therapy as scheduled.

## 2022-01-04 ENCOUNTER — TELEMEDICINE (OUTPATIENT)
Dept: BEHAVIORAL HEALTH | Facility: CLINIC | Age: 42
End: 2022-01-04

## 2022-01-04 DIAGNOSIS — F50.01 ANOREXIA NERVOSA, RESTRICTING TYPE: ICD-10-CM

## 2022-01-04 DIAGNOSIS — G47.00 INSOMNIA, UNSPECIFIED TYPE: ICD-10-CM

## 2022-01-04 DIAGNOSIS — F41.1 GENERALIZED ANXIETY DISORDER: Primary | ICD-10-CM

## 2022-01-04 PROBLEM — T14.8XXA BROKEN BONES: Status: ACTIVE | Noted: 2022-01-04

## 2022-01-04 PROBLEM — M19.90 ARTHRITIS: Status: ACTIVE | Noted: 2022-01-04

## 2022-01-04 PROBLEM — O99.013 ANEMIA AFFECTING PREGNANCY IN THIRD TRIMESTER: Status: ACTIVE | Noted: 2018-03-13

## 2022-01-04 PROBLEM — K90.9 VITAMIN B12 DEFICIENCY DUE TO INTESTINAL MALABSORPTION: Status: ACTIVE | Noted: 2017-02-24

## 2022-01-04 PROBLEM — Z98.84 HISTORY OF GASTRIC BYPASS: Status: ACTIVE | Noted: 2017-02-16

## 2022-01-04 PROBLEM — F41.9 ANXIETY: Status: ACTIVE | Noted: 2022-01-04

## 2022-01-04 PROBLEM — K21.9 ACID REFLUX: Status: ACTIVE | Noted: 2022-01-04

## 2022-01-04 PROBLEM — D64.9 ANEMIA: Status: ACTIVE | Noted: 2022-01-04

## 2022-01-04 PROBLEM — Z34.80 PRENATAL CARE OF MULTIGRAVIDA, ANTEPARTUM: Status: ACTIVE | Noted: 2017-10-11

## 2022-01-04 PROBLEM — Z98.891 HISTORY OF UTERINE SCAR FROM PREVIOUS SURGERY: Status: ACTIVE | Noted: 2018-03-13

## 2022-01-04 PROBLEM — E53.8 VITAMIN B12 DEFICIENCY DUE TO INTESTINAL MALABSORPTION: Status: ACTIVE | Noted: 2017-02-24

## 2022-01-04 PROBLEM — K64.9 HEMORRHOIDS: Status: ACTIVE | Noted: 2022-01-04

## 2022-01-04 PROBLEM — G89.29 CHRONIC PAIN: Status: ACTIVE | Noted: 2021-10-01

## 2022-01-04 PROBLEM — I10 HIGH BLOOD PRESSURE: Status: ACTIVE | Noted: 2022-01-04

## 2022-01-04 PROBLEM — J30.2 SEASONAL ALLERGIC RHINITIS: Status: ACTIVE | Noted: 2022-01-04

## 2022-01-04 PROBLEM — N92.1 MENOMETRORRHAGIA: Status: ACTIVE | Noted: 2020-02-25

## 2022-01-04 PROBLEM — K21.9 GERD (GASTROESOPHAGEAL REFLUX DISEASE): Status: ACTIVE | Noted: 2022-01-04

## 2022-01-04 PROCEDURE — 90792 PSYCH DIAG EVAL W/MED SRVCS: CPT | Performed by: PHYSICIAN ASSISTANT

## 2022-01-04 RX ORDER — TRAZODONE HYDROCHLORIDE 50 MG/1
TABLET ORAL
Qty: 60 TABLET | Refills: 1 | Status: SHIPPED | OUTPATIENT
Start: 2022-01-04 | End: 2022-02-01 | Stop reason: SDUPTHER
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
## 2022-01-21 RX ORDER — TIZANIDINE 2 MG/1
TABLET ORAL
Qty: 90 TABLET | Refills: 1 | OUTPATIENT
Start: 2022-01-21

## 2022-02-01 ENCOUNTER — OFFICE VISIT (OUTPATIENT)
Dept: INTERNAL MEDICINE | Facility: CLINIC | Age: 42
End: 2022-02-01

## 2022-02-01 ENCOUNTER — TELEMEDICINE (OUTPATIENT)
Dept: BEHAVIORAL HEALTH | Facility: CLINIC | Age: 42
End: 2022-02-01

## 2022-02-01 VITALS
DIASTOLIC BLOOD PRESSURE: 62 MMHG | RESPIRATION RATE: 18 BRPM | HEART RATE: 60 BPM | BODY MASS INDEX: 25.06 KG/M2 | OXYGEN SATURATION: 98 % | TEMPERATURE: 96 F | HEIGHT: 62 IN | SYSTOLIC BLOOD PRESSURE: 112 MMHG

## 2022-02-01 DIAGNOSIS — F33.41 RECURRENT MAJOR DEPRESSIVE DISORDER, IN PARTIAL REMISSION: ICD-10-CM

## 2022-02-01 DIAGNOSIS — E55.9 VITAMIN D DEFICIENCY: ICD-10-CM

## 2022-02-01 DIAGNOSIS — R63.4 WEIGHT LOSS: ICD-10-CM

## 2022-02-01 DIAGNOSIS — F50.01 ANOREXIA NERVOSA, RESTRICTING TYPE: ICD-10-CM

## 2022-02-01 DIAGNOSIS — R74.01 TRANSAMINITIS: ICD-10-CM

## 2022-02-01 DIAGNOSIS — F41.9 ANXIETY: ICD-10-CM

## 2022-02-01 DIAGNOSIS — F41.1 GENERALIZED ANXIETY DISORDER: Primary | ICD-10-CM

## 2022-02-01 DIAGNOSIS — G47.00 INSOMNIA, UNSPECIFIED TYPE: ICD-10-CM

## 2022-02-01 DIAGNOSIS — E83.19 IRON OVERLOAD: ICD-10-CM

## 2022-02-01 DIAGNOSIS — G43.909 MIGRAINE WITHOUT STATUS MIGRAINOSUS, NOT INTRACTABLE, UNSPECIFIED MIGRAINE TYPE: Primary | ICD-10-CM

## 2022-02-01 DIAGNOSIS — L65.9 HAIR LOSS: ICD-10-CM

## 2022-02-01 PROBLEM — O99.013 ANEMIA AFFECTING PREGNANCY IN THIRD TRIMESTER: Status: RESOLVED | Noted: 2018-03-13 | Resolved: 2022-02-01

## 2022-02-01 LAB
25(OH)D3 SERPL-MCNC: 22.3 NG/ML (ref 30–100)
ALBUMIN SERPL-MCNC: 4.3 G/DL (ref 3.5–5.2)
ALBUMIN/GLOB SERPL: 1.5 G/DL
ALP SERPL-CCNC: 176 U/L (ref 39–117)
ALT SERPL W P-5'-P-CCNC: 303 U/L (ref 1–33)
ANION GAP SERPL CALCULATED.3IONS-SCNC: 13 MMOL/L (ref 5–15)
AST SERPL-CCNC: 145 U/L (ref 1–32)
BASOPHILS # BLD AUTO: 0.07 10*3/MM3 (ref 0–0.2)
BASOPHILS NFR BLD AUTO: 1.1 % (ref 0–1.5)
BILIRUB SERPL-MCNC: 0.5 MG/DL (ref 0–1.2)
BUN SERPL-MCNC: 13 MG/DL (ref 6–20)
BUN/CREAT SERPL: 16.3 (ref 7–25)
CALCIUM SPEC-SCNC: 9.2 MG/DL (ref 8.6–10.5)
CHLORIDE SERPL-SCNC: 109 MMOL/L (ref 98–107)
CO2 SERPL-SCNC: 20 MMOL/L (ref 22–29)
CREAT SERPL-MCNC: 0.8 MG/DL (ref 0.57–1)
DEPRECATED RDW RBC AUTO: 43.2 FL (ref 37–54)
EOSINOPHIL # BLD AUTO: 0.72 10*3/MM3 (ref 0–0.4)
EOSINOPHIL NFR BLD AUTO: 10.9 % (ref 0.3–6.2)
ERYTHROCYTE [DISTWIDTH] IN BLOOD BY AUTOMATED COUNT: 12.2 % (ref 12.3–15.4)
FERRITIN SERPL-MCNC: 122 NG/ML (ref 13–150)
GFR SERPL CREATININE-BSD FRML MDRD: 80 ML/MIN/1.73
GLOBULIN UR ELPH-MCNC: 2.9 GM/DL
GLUCOSE SERPL-MCNC: 79 MG/DL (ref 65–99)
HCT VFR BLD AUTO: 43.5 % (ref 34–46.6)
HGB BLD-MCNC: 14.9 G/DL (ref 12–15.9)
IMM GRANULOCYTES # BLD AUTO: 0.01 10*3/MM3 (ref 0–0.05)
IMM GRANULOCYTES NFR BLD AUTO: 0.2 % (ref 0–0.5)
IRON 24H UR-MRATE: 166 MCG/DL (ref 37–145)
IRON SATN MFR SERPL: 36 % (ref 20–50)
LYMPHOCYTES # BLD AUTO: 1.52 10*3/MM3 (ref 0.7–3.1)
LYMPHOCYTES NFR BLD AUTO: 23.1 % (ref 19.6–45.3)
MCH RBC QN AUTO: 33 PG (ref 26.6–33)
MCHC RBC AUTO-ENTMCNC: 34.3 G/DL (ref 31.5–35.7)
MCV RBC AUTO: 96.5 FL (ref 79–97)
MONOCYTES # BLD AUTO: 0.53 10*3/MM3 (ref 0.1–0.9)
MONOCYTES NFR BLD AUTO: 8.1 % (ref 5–12)
NEUTROPHILS NFR BLD AUTO: 3.73 10*3/MM3 (ref 1.7–7)
NEUTROPHILS NFR BLD AUTO: 56.6 % (ref 42.7–76)
NRBC BLD AUTO-RTO: 0.2 /100 WBC (ref 0–0.2)
PLATELET # BLD AUTO: 325 10*3/MM3 (ref 140–450)
PMV BLD AUTO: 10.2 FL (ref 6–12)
POTASSIUM SERPL-SCNC: 4.3 MMOL/L (ref 3.5–5.2)
PROT SERPL-MCNC: 7.2 G/DL (ref 6–8.5)
RBC # BLD AUTO: 4.51 10*6/MM3 (ref 3.77–5.28)
SODIUM SERPL-SCNC: 142 MMOL/L (ref 136–145)
T3FREE SERPL-MCNC: 2.62 PG/ML (ref 2–4.4)
T4 FREE SERPL-MCNC: 1.13 NG/DL (ref 0.93–1.7)
TIBC SERPL-MCNC: 465 MCG/DL (ref 298–536)
TRANSFERRIN SERPL-MCNC: 312 MG/DL (ref 200–360)
TSH SERPL DL<=0.05 MIU/L-ACNC: 0.98 UIU/ML (ref 0.27–4.2)
VIT B12 BLD-MCNC: 382 PG/ML (ref 211–946)
WBC NRBC COR # BLD: 6.58 10*3/MM3 (ref 3.4–10.8)

## 2022-02-01 PROCEDURE — 80050 GENERAL HEALTH PANEL: CPT | Performed by: NURSE PRACTITIONER

## 2022-02-01 PROCEDURE — 82728 ASSAY OF FERRITIN: CPT | Performed by: NURSE PRACTITIONER

## 2022-02-01 PROCEDURE — 84481 FREE ASSAY (FT-3): CPT | Performed by: NURSE PRACTITIONER

## 2022-02-01 PROCEDURE — 83540 ASSAY OF IRON: CPT | Performed by: NURSE PRACTITIONER

## 2022-02-01 PROCEDURE — 99213 OFFICE O/P EST LOW 20 MIN: CPT | Performed by: PHYSICIAN ASSISTANT

## 2022-02-01 PROCEDURE — 84439 ASSAY OF FREE THYROXINE: CPT | Performed by: NURSE PRACTITIONER

## 2022-02-01 PROCEDURE — 90833 PSYTX W PT W E/M 30 MIN: CPT | Performed by: PHYSICIAN ASSISTANT

## 2022-02-01 PROCEDURE — 99214 OFFICE O/P EST MOD 30 MIN: CPT | Performed by: NURSE PRACTITIONER

## 2022-02-01 PROCEDURE — 82306 VITAMIN D 25 HYDROXY: CPT | Performed by: NURSE PRACTITIONER

## 2022-02-01 PROCEDURE — 84466 ASSAY OF TRANSFERRIN: CPT | Performed by: NURSE PRACTITIONER

## 2022-02-01 PROCEDURE — 82607 VITAMIN B-12: CPT | Performed by: NURSE PRACTITIONER

## 2022-02-01 RX ORDER — ONDANSETRON 4 MG/1
TABLET, FILM COATED ORAL
COMMUNITY
Start: 2022-01-13 | End: 2022-02-16

## 2022-02-01 RX ORDER — TRAZODONE HYDROCHLORIDE 50 MG/1
TABLET ORAL
Qty: 60 TABLET | Refills: 1 | Status: SHIPPED | OUTPATIENT
Start: 2022-02-01 | End: 2022-02-17 | Stop reason: SDUPTHER

## 2022-02-01 RX ORDER — SUMATRIPTAN 25 MG/1
TABLET, FILM COATED ORAL
Qty: 12 TABLET | Refills: 5 | Status: SHIPPED | OUTPATIENT
Start: 2022-02-01 | End: 2022-03-09 | Stop reason: SDUPTHER

## 2022-02-01 RX ORDER — SERTRALINE HYDROCHLORIDE 100 MG/1
100 TABLET, FILM COATED ORAL DAILY
Qty: 30 TABLET | Refills: 1 | Status: SHIPPED | OUTPATIENT
Start: 2022-02-01 | End: 2022-02-17 | Stop reason: SDUPTHER

## 2022-02-01 RX ORDER — TOPIRAMATE 100 MG/1
100 TABLET, FILM COATED ORAL 2 TIMES DAILY
Qty: 180 TABLET | Refills: 1 | Status: SHIPPED | OUTPATIENT
Start: 2022-02-01 | End: 2022-03-21 | Stop reason: SDUPTHER

## 2022-02-01 RX ORDER — FLUCONAZOLE 150 MG/1
TABLET ORAL
COMMUNITY
End: 2022-02-16

## 2022-02-01 NOTE — PROGRESS NOTES
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  February 1, 2022 10:45 EST      Part of this note may be an electronic transcription/translation of spoken language to printed text using the Dragon Dictation System.

## 2022-02-01 NOTE — ASSESSMENT & PLAN NOTE
Headaches have been well controlled on Topamax 100 mg 1 tablet twice daily. We are removing Nurtec from her medication list and adding Imitrex again.

## 2022-02-01 NOTE — PROGRESS NOTES
Chief Complaint  Follow-up and Headache    Subjective          Josiane Higuera presents to [facility]  History of Present Illness  Ms. Higuera presents to the office for a follow-up on her headaches.    Headaches  Ms. Higuera reports that her headaches have been well controlled on Topamax 100 mg 1 tablet twice daily. She has had 2 migraines in the last month. She has not taken Nurtec because the headaches were mild, and the Nurtec is less effective and causes more unpleasant side effects than the Imitrex.     Fatigue and hair loss  Ms. Higuera has been fatigued and has had hair loss. She wants to check her thyroid function and iron levels. She admits she has been restricting her eating for months and was diagnosed with anorexia, but she has been eating more consistently for approximately 1 month.  She notes she does not absorb iron well because of her gastric bypass surgery.    Elevated cholesterol  Ms. Higuera reports that her cholesterol levels have been high. She states she has not changed her eating habits and does not typically eat unhealthy foods.     Weight loss  She states she was diagnosed with anorexia. For months she was restricting her eating with days of no food intake, but she has been eating more consistently for approximately 1 month. She feels her excess loose skin may have triggered this recent episode of food restriction, as she was seeing the loose skin as fat. She is having surgery on 04/01/2022 for loose skin removal surgery with Dr. Watt in [facility]. She does not want to know if she has gained or lost weight since her last visit with Gwendolyn Ye PA-C, and she does not want to know her current weight so as to avoid triggering another episode. She is seeing her therapist in psychiatry today. She feels like she is in a good head space right now.    Depression  Ms. Higuera feels the Zoloft has started to make a difference and she is feeling better. She has been  "taking Zoloft for 1 month.  Objective   Vital Signs:   /62 (BP Location: Left arm, Patient Position: Sitting, Cuff Size: Adult)   Pulse 60   Temp 96 °F (35.6 °C)   Resp 18   Ht 157.5 cm (62\")   SpO2 98%   BMI 25.06 kg/m²     Physical Exam  Vitals reviewed.   Constitutional:       Appearance: Normal appearance. She is well-developed.   HENT:      Head: Normocephalic and atraumatic.      Right Ear: External ear normal.      Left Ear: External ear normal.   Eyes:      Conjunctiva/sclera: Conjunctivae normal.      Pupils: Pupils are equal, round, and reactive to light.   Cardiovascular:      Rate and Rhythm: Normal rate and regular rhythm.      Heart sounds: No murmur heard.  No friction rub. No gallop.    Pulmonary:      Effort: Pulmonary effort is normal.      Breath sounds: Normal breath sounds. No wheezing or rhonchi.   Skin:     General: Skin is warm and dry.   Neurological:      Mental Status: She is alert and oriented to person, place, and time.      Cranial Nerves: No cranial nerve deficit.   Psychiatric:         Mood and Affect: Affect normal.         Behavior: Behavior normal.         Thought Content: Thought content normal.          Result Review :            Procedures      Assessment and Plan    Diagnoses and all orders for this visit:    1. Migraine without status migrainosus, not intractable, unspecified migraine type (Primary)  Assessment & Plan:  Headaches have been well controlled on Topamax 100 mg 1 tablet twice daily. We are removing Nurtec from her medication list and adding Imitrex again.      Orders:  -     topiramate (Topamax) 100 MG tablet; Take 1 tablet by mouth 2 (Two) Times a Day.  Dispense: 180 tablet; Refill: 1  -     SUMAtriptan (Imitrex) 25 MG tablet; Take one tablet at onset of headache. May repeat dose one time in 2 hours if headache not relieved.  Dispense: 12 tablet; Refill: 5    2. Anxiety  Assessment & Plan:  She has some improvement on Zoloft. She will continue with the " Zoloft and continue to see Mercedes Toro PA-C.      3. Recurrent major depressive disorder, in partial remission (HCC)    4. Iron overload  Comments:  Check iron and ferritin. Rule out other disease processes. She should have a deficit for absorption given the bypass and that she is not taking a supplement.  Orders:  -     Ferritin  -     Iron Profile  -     Comprehensive Metabolic Panel  -     CBC Auto Differential    5. Hair loss  Comments:   She is having fatigue and hair loss. We will recheck her thyroid levels.  Orders:  -     Vitamin B12  -     Vitamin D 25 Hydroxy  -     T4, Free  -     TSH  -     T3, Free    6. Weight loss  Comments:  I want to recheck her cholesterol in the future as this has been elevated despite her weight loss.     7. Vitamin D deficiency  Comments:  Her last vitamin D level was insufficient. We will recheck. We may want to consider a supplement.  Orders:  -     Vitamin D 25 Hydroxy            Follow Up   Return in about 2 months (around 4/1/2022).  Patient was given instructions and counseling regarding her condition or for health maintenance advice. Please see specific information pulled into the AVS if appropriate.       Transcribed from ambient dictation for EDSON Johnston by Rody Acevedo.  02/01/22   12:10 EST    Patient verbalized consent to the visit recording.

## 2022-02-01 NOTE — ASSESSMENT & PLAN NOTE
She has some improvement on Zoloft. She will continue with the Zoloft and continue to see Mercedes Toro PA-C.

## 2022-02-08 ENCOUNTER — TELEPHONE (OUTPATIENT)
Dept: INTERNAL MEDICINE | Facility: CLINIC | Age: 42
End: 2022-02-08

## 2022-02-08 DIAGNOSIS — R89.8 EOSINOPHILS INCREASED: Primary | ICD-10-CM

## 2022-02-08 NOTE — TELEPHONE ENCOUNTER
Caller: Josiane Higuera    Relationship: Self    Best call back number: [phone]    Caller requesting test results: SELF    What test was performed: BLOOD WORK    When was the test performed: 2/1/22    Where was the test performed: [facility]

## 2022-02-09 NOTE — TELEPHONE ENCOUNTER
"Message taken off voicemail.      Pt calling again for results. States, \"Don't tell me they are normal because I can see that they are not\".      "

## 2022-02-10 NOTE — TELEPHONE ENCOUNTER
Most often eosinophils are related to an allergic response.  Eosinophil elevation is very common in this area due to the amount of seasonal allergies that we see.  However, there are other causes of elevated eosinophils including parasites.  I am less suspicious for parasites at this time.  We can recheck her CBC in 4 to 6 weeks to see if this improves.  She may have repeat labs with GI for further eval of elevated liver enzymes.

## 2022-02-15 ENCOUNTER — CLINICAL SUPPORT (OUTPATIENT)
Dept: INTERNAL MEDICINE | Facility: CLINIC | Age: 42
End: 2022-02-15

## 2022-02-15 DIAGNOSIS — R74.01 TRANSAMINITIS: ICD-10-CM

## 2022-02-15 DIAGNOSIS — R89.8 EOSINOPHILS INCREASED: ICD-10-CM

## 2022-02-15 LAB
BASOPHILS # BLD AUTO: 0.07 10*3/MM3 (ref 0–0.2)
BASOPHILS NFR BLD AUTO: 0.9 % (ref 0–1.5)
DEPRECATED RDW RBC AUTO: 45.5 FL (ref 37–54)
EOSINOPHIL # BLD AUTO: 0.29 10*3/MM3 (ref 0–0.4)
EOSINOPHIL NFR BLD AUTO: 3.7 % (ref 0.3–6.2)
ERYTHROCYTE [DISTWIDTH] IN BLOOD BY AUTOMATED COUNT: 12.7 % (ref 12.3–15.4)
GGT SERPL-CCNC: 26 U/L (ref 5–36)
HAV IGM SERPL QL IA: NORMAL
HBV CORE IGM SERPL QL IA: NORMAL
HBV SURFACE AG SERPL QL IA: NORMAL
HCT VFR BLD AUTO: 43.5 % (ref 34–46.6)
HCV AB SER DONR QL: NORMAL
HGB BLD-MCNC: 14.9 G/DL (ref 12–15.9)
IMM GRANULOCYTES # BLD AUTO: 0.02 10*3/MM3 (ref 0–0.05)
IMM GRANULOCYTES NFR BLD AUTO: 0.3 % (ref 0–0.5)
LYMPHOCYTES # BLD AUTO: 2.18 10*3/MM3 (ref 0.7–3.1)
LYMPHOCYTES NFR BLD AUTO: 27.5 % (ref 19.6–45.3)
MCH RBC QN AUTO: 33.8 PG (ref 26.6–33)
MCHC RBC AUTO-ENTMCNC: 34.3 G/DL (ref 31.5–35.7)
MCV RBC AUTO: 98.6 FL (ref 79–97)
MONOCYTES # BLD AUTO: 0.37 10*3/MM3 (ref 0.1–0.9)
MONOCYTES NFR BLD AUTO: 4.7 % (ref 5–12)
NEUTROPHILS NFR BLD AUTO: 5.01 10*3/MM3 (ref 1.7–7)
NEUTROPHILS NFR BLD AUTO: 62.9 % (ref 42.7–76)
NRBC BLD AUTO-RTO: 0 /100 WBC (ref 0–0.2)
PLATELET # BLD AUTO: 369 10*3/MM3 (ref 140–450)
PMV BLD AUTO: 10.5 FL (ref 6–12)
RBC # BLD AUTO: 4.41 10*6/MM3 (ref 3.77–5.28)
WBC NRBC COR # BLD: 7.94 10*3/MM3 (ref 3.4–10.8)

## 2022-02-15 PROCEDURE — 86038 ANTINUCLEAR ANTIBODIES: CPT | Performed by: NURSE PRACTITIONER

## 2022-02-15 PROCEDURE — 80074 ACUTE HEPATITIS PANEL: CPT | Performed by: NURSE PRACTITIONER

## 2022-02-15 PROCEDURE — 84165 PROTEIN E-PHORESIS SERUM: CPT | Performed by: NURSE PRACTITIONER

## 2022-02-15 PROCEDURE — 82977 ASSAY OF GGT: CPT | Performed by: NURSE PRACTITIONER

## 2022-02-15 PROCEDURE — 36415 COLL VENOUS BLD VENIPUNCTURE: CPT | Performed by: NURSE PRACTITIONER

## 2022-02-15 PROCEDURE — 85025 COMPLETE CBC W/AUTO DIFF WBC: CPT | Performed by: NURSE PRACTITIONER

## 2022-02-15 PROCEDURE — 86225 DNA ANTIBODY NATIVE: CPT | Performed by: NURSE PRACTITIONER

## 2022-02-15 PROCEDURE — 84155 ASSAY OF PROTEIN SERUM: CPT | Performed by: NURSE PRACTITIONER

## 2022-02-15 NOTE — PROGRESS NOTES
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 Medications Ordered This Visit   Medications   • sertraline (Zoloft) 100 MG tablet     Sig: Take 1 tablet by mouth Daily.     Dispense:  30 tablet     Refill:  1   • traZODone (DESYREL) 50 MG tablet     Sig: Take 1 tab PO QHS for sleep. Can take 1 tab PO one hour later if needed     Dispense:  60 tablet     Refill:  1     Discussed all risks, benefits, alternatives, and side effects of Sertraline including but not limited to GI upset, sexual dysfunction, bleeding risk, seizure risk, insomnia, sedation, dizziness, fatigue, activation of elena or hypomania, increased fragility fracture risk, hyponatremia, ocular effects, withdrawal syndrome following abrupt discontinuation, serotonin syndrome, and activation of suicidal ideation and behavior.  Pt educated on the need to practice safe sex while taking this med. Discussed the need for pt to immediately call the office for any new or worsening symptoms, such as worsening depression; feeling nervous or restless; suicidal thoughts or actions; or other changes changes in mood or behavior, and all other concerns. Pt educated on med compliance and the risks of suddenly stopping this medication or missing doses. Pt verbalized understanding and is  agreeable to taking Sertraline. Addressed all questions and concerns.     Discussed all risks, benefits, alternatives, and side effects of Trazodone including but not limited to GI upset, sexual dysfunction, dizziness, headache, nervousness, bleeding risk, seizure risk, sedation, headache, activation of elena or hypomania, increased fragility fracture risk, cardiac arrhythmias, priapism, hyponatremia, ocular effects, prolonged QT interval, withdrawal syndrome following abrupt discontinuation, serotonin syndrome, and activation of suicidal ideation and behavior.  Pt educated on the need to practice safe sex while taking this med. Discussed the need for pt to immediately call the office for any new or worsening symptoms, such as worsening depression; feeling nervous or restless; suicidal thoughts or actions; or other changes changes in mood or behavior, and all other concerns. Pt educated on med compliance and the risks of suddenly stopping this medication or missing doses. Pt verbalized understanding and is agreeable to taking Trazodone. Addressed all questions and concerns.       6. Follow up:   F/u in 2 weeks.     TREATMENT PLAN/GOALS: Continue supportive psychotherapy efforts and medications as indicated. Treatment and medication options discussed during today's visit. Patient ackowledged and verbally consented to continue with current treatment plan and was educated on the importance of compliance with treatment and follow-up appointments.    MEDICATION ISSUES:  LAI reviewed as expected.  Discussed medication options and treatment plan of prescribed medication as well as the risks, benefits, and side effects including potential falls, possible impaired driving and metabolic adversities among others. Patient is agreeable to call the office with any worsening of symptoms or onset of side effects. Patient is agreeable to call 911 or go to the nearest ER should he/she begin having SI/HI. No medication side effects  or related complaints today.       16 minutes of supportive psychotherapy with goal to strengthen defenses, promote problems solving, restore adaptive functioning and provide symptom relief. The therapeutic alliance was strengthened to encourage the patient to express their thoughts and feelings. Esteem building was enhanced through praise, reassurance, normalizing and encouragement. Coping skills were enhanced using mindfulness (deep breathing, progressive muscle relaxation, imagery, grounding & meditation) and cognitive behavioral strategies (reviewing cognitive distortions, negative self-talk/thought stopping and cognitive restructuring, through de-catastrophizing and examining options/alternatives) to build distress tolerance skills and emotional regulation.  Patient encouraged to practice negative thought stopping, in which the patient recognizes negative thoughts early and attempts to replace these thoughts with positive thoughts. Patient given education on medication side effects, diagnosis/illness and relapse symptoms. Plan to continue supportive psychotherapy in next appointment to provide symptom relief.             This document has been electronically signed by Mercedes Toro PA-C  February 17, 2022 13:10 EST      Part of this note may be an electronic transcription/translation of spoken language to printed text using the Dragon Dictation System.

## 2022-02-16 ENCOUNTER — TELEMEDICINE (OUTPATIENT)
Dept: PSYCHIATRY | Facility: CLINIC | Age: 42
End: 2022-02-16

## 2022-02-16 DIAGNOSIS — F41.1 GENERALIZED ANXIETY DISORDER: Primary | ICD-10-CM

## 2022-02-16 DIAGNOSIS — G47.00 INSOMNIA, UNSPECIFIED TYPE: ICD-10-CM

## 2022-02-16 DIAGNOSIS — F50.01 ANOREXIA NERVOSA, RESTRICTING TYPE: ICD-10-CM

## 2022-02-16 LAB
ALBUMIN SERPL ELPH-MCNC: 3.8 G/DL (ref 2.9–4.4)
ALBUMIN/GLOB SERPL: 1.3 {RATIO} (ref 0.7–1.7)
ALPHA1 GLOB SERPL ELPH-MCNC: 0.2 G/DL (ref 0–0.4)
ALPHA2 GLOB SERPL ELPH-MCNC: 0.8 G/DL (ref 0.4–1)
B-GLOBULIN SERPL ELPH-MCNC: 1 G/DL (ref 0.7–1.3)
GAMMA GLOB SERPL ELPH-MCNC: 1 G/DL (ref 0.4–1.8)
GLOBULIN SER CALC-MCNC: 3 G/DL (ref 2.2–3.9)
LABORATORY COMMENT REPORT: NORMAL
M PROTEIN SERPL ELPH-MCNC: NORMAL G/DL
PROT PATTERN SERPL ELPH-IMP: NORMAL
PROT SERPL-MCNC: 6.8 G/DL (ref 6–8.5)

## 2022-02-16 PROCEDURE — 99214 OFFICE O/P EST MOD 30 MIN: CPT | Performed by: PHYSICIAN ASSISTANT

## 2022-02-16 RX ORDER — HYDROCODONE BITARTRATE AND ACETAMINOPHEN 7.5; 325 MG/1; MG/1
TABLET ORAL EVERY 8 HOURS
COMMUNITY
End: 2022-05-10

## 2022-02-17 LAB
DSDNA IGG SERPL IA-ACNC: NEGATIVE [IU]/ML
NUCLEAR IGG SER IA-RTO: NEGATIVE

## 2022-02-17 RX ORDER — SERTRALINE HYDROCHLORIDE 100 MG/1
100 TABLET, FILM COATED ORAL DAILY
Qty: 30 TABLET | Refills: 1 | Status: SHIPPED | OUTPATIENT
Start: 2022-02-17 | End: 2022-03-18 | Stop reason: SDUPTHER

## 2022-02-17 RX ORDER — TRAZODONE HYDROCHLORIDE 50 MG/1
TABLET ORAL
Qty: 60 TABLET | Refills: 1 | Status: SHIPPED | OUTPATIENT
Start: 2022-02-17 | End: 2022-03-18 | Stop reason: SDUPTHER

## 2022-02-25 DIAGNOSIS — F50.01 ANOREXIA NERVOSA, RESTRICTING TYPE: ICD-10-CM

## 2022-02-25 DIAGNOSIS — G47.00 INSOMNIA, UNSPECIFIED TYPE: ICD-10-CM

## 2022-02-25 DIAGNOSIS — F41.1 GENERALIZED ANXIETY DISORDER: ICD-10-CM

## 2022-03-01 NOTE — PROGRESS NOTES
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 discontinuation, serotonin syndrome, and activation of suicidal ideation and behavior.  Pt educated on the need to practice safe sex while taking this med. Discussed the need for pt to immediately call the office for any new or worsening symptoms, such as worsening depression; feeling nervous or restless; suicidal thoughts or actions; or other changes changes in mood or behavior, and all other concerns. Pt educated on med compliance and the risks of suddenly stopping this medication or missing doses. Pt verbalized understanding and is agreeable to taking Sertraline. Addressed all questions and concerns.     Discussed all risks, benefits, alternatives, and side effects of Trazodone including but not limited to GI upset, sexual dysfunction, dizziness, headache, nervousness, bleeding risk, seizure risk, sedation, headache, activation of elena or hypomania, increased fragility fracture risk, cardiac arrhythmias, priapism, hyponatremia, ocular effects, prolonged QT interval, withdrawal syndrome following abrupt discontinuation, serotonin syndrome, and activation of suicidal ideation and behavior.  Pt educated on the need to practice safe sex while taking this med. Discussed the need for pt to  immediately call the office for any new or worsening symptoms, such as worsening depression; feeling nervous or restless; suicidal thoughts or actions; or other changes changes in mood or behavior, and all other concerns. Pt educated on med compliance and the risks of suddenly stopping this medication or missing doses. Pt verbalized understanding and is agreeable to taking Trazodone. Addressed all questions and concerns.       6. Follow up:   F/u in 2 weeks.     TREATMENT PLAN/GOALS: Continue supportive psychotherapy efforts and medications as indicated. Treatment and medication options discussed during today's visit. Patient ackowledged and verbally consented to continue with current treatment plan and was educated on the importance of compliance with treatment and follow-up appointments.    MEDICATION ISSUES:  LAI reviewed as expected.  Discussed medication options and treatment plan of prescribed medication as well as the risks, benefits, and side effects including potential falls, possible impaired driving and metabolic adversities among others. Patient is agreeable to call the office with any worsening of symptoms or onset of side effects. Patient is agreeable to call 911 or go to the nearest ER should he/she begin having SI/HI. No medication side effects or related complaints today.       17 minutes of supportive psychotherapy with goal to strengthen defenses, promote problems solving, restore adaptive functioning and provide symptom relief. The therapeutic alliance was strengthened to encourage the patient to express their thoughts and feelings. Esteem building was enhanced through praise, reassurance, normalizing and encouragement. Coping skills were enhanced using mindfulness (deep breathing, progressive muscle relaxation, imagery, grounding & meditation) and cognitive behavioral strategies (reviewing cognitive distortions, negative self-talk/thought stopping and cognitive restructuring, through  de-catastrophizing and examining options/alternatives) to build distress tolerance skills and emotional regulation.  Patient encouraged to practice negative thought stopping, in which the patient recognizes negative thoughts early and attempts to replace these thoughts with positive thoughts. Patient given education on medication side effects, diagnosis/illness and relapse symptoms. Plan to continue supportive psychotherapy in next appointment to provide symptom relief.             This document has been electronically signed by Mercedes Toro PA-C  March 2, 2022 10:27 EST      Part of this note may be an electronic transcription/translation of spoken language to printed text using the Dragon Dictation System.

## 2022-03-02 ENCOUNTER — TELEMEDICINE (OUTPATIENT)
Dept: PSYCHIATRY | Facility: CLINIC | Age: 42
End: 2022-03-02

## 2022-03-02 DIAGNOSIS — F41.1 GENERALIZED ANXIETY DISORDER: Primary | ICD-10-CM

## 2022-03-02 DIAGNOSIS — F50.01 ANOREXIA NERVOSA, RESTRICTING TYPE: ICD-10-CM

## 2022-03-02 PROCEDURE — 90833 PSYTX W PT W E/M 30 MIN: CPT | Performed by: PHYSICIAN ASSISTANT

## 2022-03-02 PROCEDURE — 99213 OFFICE O/P EST LOW 20 MIN: CPT | Performed by: PHYSICIAN ASSISTANT

## 2022-03-02 RX ORDER — METHOCARBAMOL 500 MG/1
1000 TABLET, FILM COATED ORAL 3 TIMES DAILY
COMMUNITY
Start: 2022-02-26 | End: 2022-12-13

## 2022-03-09 DIAGNOSIS — F41.1 GENERALIZED ANXIETY DISORDER: ICD-10-CM

## 2022-03-09 DIAGNOSIS — G43.909 MIGRAINE WITHOUT STATUS MIGRAINOSUS, NOT INTRACTABLE, UNSPECIFIED MIGRAINE TYPE: ICD-10-CM

## 2022-03-09 DIAGNOSIS — G47.00 INSOMNIA, UNSPECIFIED TYPE: ICD-10-CM

## 2022-03-09 DIAGNOSIS — F50.01 ANOREXIA NERVOSA, RESTRICTING TYPE: ICD-10-CM

## 2022-03-09 RX ORDER — SUMATRIPTAN 25 MG/1
TABLET, FILM COATED ORAL
Qty: 12 TABLET | Refills: 5 | Status: SHIPPED | OUTPATIENT
Start: 2022-03-09 | End: 2023-03-08 | Stop reason: SDUPTHER

## 2022-03-17 NOTE — PROGRESS NOTES
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 SI, no history of suicide attempts or self-harm in the past, minimal AODA, healthcare seeking, future orientation, willingness to engage in care.  Risk of self-harm chronically is also moderate, but could be further elevated in the event of treatment noncompliance and/or AODA.  4. Labs/Diagnostics Ordered:   No orders of the defined types were placed in this encounter.    5. Medications:   New Medications Ordered This Visit   Medications   • propranolol (INDERAL) 20 MG tablet     Sig: Take 1 tablet by mouth 2 (Two) Times a Day As Needed (anxiety) for up to 30 days.     Dispense:  60 tablet     Refill:  1   • sertraline (Zoloft) 100 MG tablet     Sig: Take 1 tablet by mouth Daily.     Dispense:  30 tablet     Refill:  1   • traZODone (DESYREL) 50 MG tablet     Sig: Take 1 tab PO QHS for sleep. Can take 1 tab PO one hour later if needed     Dispense:  60 tablet     Refill:  1     Discussed all risks, benefits, alternatives, and side effects of Sertraline including but not limited to GI upset, sexual dysfunction, bleeding risk, seizure risk, insomnia, sedation, dizziness, fatigue, activation of elena or hypomania, increased fragility fracture risk, hyponatremia, ocular effects, withdrawal syndrome following abrupt discontinuation, serotonin  syndrome, and activation of suicidal ideation and behavior.  Pt educated on the need to practice safe sex while taking this med. Discussed the need for pt to immediately call the office for any new or worsening symptoms, such as worsening depression; feeling nervous or restless; suicidal thoughts or actions; or other changes changes in mood or behavior, and all other concerns. Pt educated on med compliance and the risks of suddenly stopping this medication or missing doses. Pt verbalized understanding and is agreeable to taking Sertraline. Addressed all questions and concerns.     Discussed all risks, benefits, alternatives, and side effects of Trazodone including but not limited to GI upset, sexual dysfunction, dizziness, headache, nervousness, bleeding risk, seizure risk, sedation, headache, activation of elena or hypomania, increased fragility fracture risk, cardiac arrhythmias, priapism, hyponatremia, ocular effects, prolonged QT interval, withdrawal syndrome following abrupt discontinuation, serotonin syndrome, and activation of suicidal ideation and behavior.  Pt educated on the need to practice safe sex while taking this med. Discussed the need for pt to immediately call the office for any new or worsening symptoms, such as worsening depression; feeling nervous or restless; suicidal thoughts or actions; or other changes changes in mood or behavior, and all other concerns. Pt educated on med compliance and the risks of suddenly stopping this medication or missing doses. Pt verbalized understanding and is agreeable to taking Trazodone. Addressed all questions and concerns.     Propranolol, Risks, benefits, alternatives discussed with patient including dizziness, sedation, falls, low blood pressure, low heart rate, possible exacerbation of asthma.  After discussion of these risks and benefits, the patient voiced understanding and agreed to proceed.      6. Follow up:   F/u in 4 weeks.     TREATMENT PLAN/GOALS:  Continue supportive psychotherapy efforts and medications as indicated. Treatment and medication options discussed during today's visit. Patient ackowledged and verbally consented to continue with current treatment plan and was educated on the importance of compliance with treatment and follow-up appointments.    MEDICATION ISSUES:  LAI reviewed as expected.  Discussed medication options and treatment plan of prescribed medication as well as the risks, benefits, and side effects including potential falls, possible impaired driving and metabolic adversities among others. Patient is agreeable to call the office with any worsening of symptoms or onset of side effects. Patient is agreeable to call 911 or go to the nearest ER should he/she begin having SI/HI. No medication side effects or related complaints today.       17 minutes of supportive psychotherapy with goal to strengthen defenses, promote problems solving, restore adaptive functioning and provide symptom relief. The therapeutic alliance was strengthened to encourage the patient to express their thoughts and feelings. Esteem building was enhanced through praise, reassurance, normalizing and encouragement. Coping skills were enhanced using mindfulness (deep breathing, progressive muscle relaxation, imagery, grounding & meditation) and cognitive behavioral strategies (reviewing cognitive distortions, negative self-talk/thought stopping and cognitive restructuring, through de-catastrophizing and examining options/alternatives) to build distress tolerance skills and emotional regulation.  Patient encouraged to practice negative thought stopping, in which the patient recognizes negative thoughts early and attempts to replace these thoughts with positive thoughts. Patient given education on medication side effects, diagnosis/illness and relapse symptoms. Plan to continue supportive psychotherapy in next appointment to provide symptom relief.             This  document has been electronically signed by Mercedes Toro PA-C  March 18, 2022 08:22 EDT      Part of this note may be an electronic transcription/translation of spoken language to printed text using the Dragon Dictation System.

## 2022-03-18 ENCOUNTER — TELEMEDICINE (OUTPATIENT)
Dept: BEHAVIORAL HEALTH | Facility: CLINIC | Age: 42
End: 2022-03-18

## 2022-03-18 DIAGNOSIS — F50.01 ANOREXIA NERVOSA, RESTRICTING TYPE: ICD-10-CM

## 2022-03-18 DIAGNOSIS — G47.00 INSOMNIA, UNSPECIFIED TYPE: ICD-10-CM

## 2022-03-18 DIAGNOSIS — F41.1 GENERALIZED ANXIETY DISORDER: Primary | ICD-10-CM

## 2022-03-18 PROCEDURE — 99213 OFFICE O/P EST LOW 20 MIN: CPT | Performed by: PHYSICIAN ASSISTANT

## 2022-03-18 RX ORDER — SERTRALINE HYDROCHLORIDE 100 MG/1
100 TABLET, FILM COATED ORAL DAILY
Qty: 30 TABLET | Refills: 1 | Status: SHIPPED | OUTPATIENT
Start: 2022-03-18 | End: 2022-04-15

## 2022-03-18 RX ORDER — PROPRANOLOL HYDROCHLORIDE 20 MG/1
20 TABLET ORAL 2 TIMES DAILY PRN
Qty: 60 TABLET | Refills: 1 | Status: SHIPPED | OUTPATIENT
Start: 2022-03-18 | End: 2022-04-11

## 2022-03-18 RX ORDER — TRAZODONE HYDROCHLORIDE 50 MG/1
TABLET ORAL
Qty: 60 TABLET | Refills: 1 | Status: SHIPPED | OUTPATIENT
Start: 2022-03-18 | End: 2022-04-15 | Stop reason: SDUPTHER

## 2022-03-20 ENCOUNTER — PATIENT MESSAGE (OUTPATIENT)
Dept: INTERNAL MEDICINE | Facility: CLINIC | Age: 42
End: 2022-03-20

## 2022-03-20 DIAGNOSIS — G43.909 MIGRAINE WITHOUT STATUS MIGRAINOSUS, NOT INTRACTABLE, UNSPECIFIED MIGRAINE TYPE: ICD-10-CM

## 2022-03-21 RX ORDER — TOPIRAMATE 50 MG/1
150 TABLET, FILM COATED ORAL 2 TIMES DAILY
Qty: 180 TABLET | Refills: 2 | Status: SHIPPED | OUTPATIENT
Start: 2022-03-21 | End: 2022-06-15

## 2022-03-21 NOTE — TELEPHONE ENCOUNTER
From: Josiane Higuera  To: EDSON Johnston  Sent: 3/20/2022 6:59 PM EDT  Subject: Topomax    Grady Lee, I was wondering if we could increase my topomax to 150mg 2x a day instead of 100mg. I’m having pretty frequent and constant headaches again where I’m having to use the Imitrex more often.    Thanks  Josiane

## 2022-04-11 DIAGNOSIS — F41.1 GENERALIZED ANXIETY DISORDER: ICD-10-CM

## 2022-04-11 RX ORDER — PROPRANOLOL HYDROCHLORIDE 20 MG/1
20 TABLET ORAL 2 TIMES DAILY PRN
Qty: 60 TABLET | Refills: 1 | Status: SHIPPED | OUTPATIENT
Start: 2022-04-11 | End: 2022-04-15

## 2022-04-14 NOTE — PROGRESS NOTES
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  Patient declines therapy although is going to consider it.  Addressed all questions and concerns.    Visit Diagnoses:    ICD-10-CM ICD-9-CM   1. Generalized anxiety disorder  F41.1 300.02   2. Anorexia nervosa, restricting type  F50.01 307.1   3. Insomnia, unspecified type  G47.00 780.52       PLAN:  1. Safety: No acute safety concerns at this time.   2. Therapy: Declines at this time.  3. Risk Assessment: Risk of self-harm acutely is moderate.  Risk factors include anxiety disorder, mood disorder, and recent psychosocial stressors (pandemic). Protective factors include no family history, denies access to guns/weapons, no present SI, no history of suicide attempts or self-harm in the past, minimal AODA, healthcare seeking, future orientation, willingness to engage in care.  Risk of self-harm chronically is also moderate, but could be further elevated in the event of treatment noncompliance and/or AODA.  4. Labs/Diagnostics Ordered:   No orders of the defined types were placed in this encounter.    5. Medications:   New Medications Ordered This Visit   Medications   • traZODone (DESYREL) 50 MG tablet     Sig: Take 1 tab PO QHS for sleep. Can take 1 tab PO one hour later if needed      Dispense:  60 tablet     Refill:  1   • sertraline (Zoloft) 100 MG tablet     Sig: Take 1.5 tablets by mouth Daily for 30 days.     Dispense:  45 tablet     Refill:  1   • propranolol (INDERAL) 40 MG tablet     Sig: Take 1 tablet by mouth 2 (Two) Times a Day As Needed (anxiety) for up to 30 days.     Dispense:  60 tablet     Refill:  1     Discussed all risks, benefits, alternatives, and side effects of Sertraline including but not limited to GI upset, sexual dysfunction, bleeding risk, seizure risk, insomnia, sedation, dizziness, fatigue, activation of elena or hypomania, increased fragility fracture risk, hyponatremia, ocular effects, withdrawal syndrome following abrupt discontinuation, serotonin syndrome, and activation of suicidal ideation and behavior.  Pt educated on the need to practice safe sex while taking this med. Discussed the need for pt to immediately call the office for any new or worsening symptoms, such as worsening depression; feeling nervous or restless; suicidal thoughts or actions; or other changes changes in mood or behavior, and all other concerns. Pt educated on med compliance and the risks of suddenly stopping this medication or missing doses. Pt verbalized understanding and is agreeable to taking Sertraline. Addressed all questions and concerns.     Discussed all risks, benefits, alternatives, and side effects of Trazodone including but not limited to GI upset, sexual dysfunction, dizziness, headache, nervousness, bleeding risk, seizure risk, sedation, headache, activation of elena or hypomania, increased fragility fracture risk, cardiac arrhythmias, priapism, hyponatremia, ocular effects, prolonged QT interval, withdrawal syndrome following abrupt discontinuation, serotonin syndrome, and activation of suicidal ideation and behavior.  Pt educated on the need to practice safe sex while taking this med. Discussed the need for pt to immediately call the office for any new or worsening  symptoms, such as worsening depression; feeling nervous or restless; suicidal thoughts or actions; or other changes changes in mood or behavior, and all other concerns. Pt educated on med compliance and the risks of suddenly stopping this medication or missing doses. Pt verbalized understanding and is agreeable to taking Trazodone. Addressed all questions and concerns.     Propranolol, Risks, benefits, alternatives discussed with patient including dizziness, sedation, falls, low blood pressure, low heart rate, possible exacerbation of asthma.  After discussion of these risks and benefits, the patient voiced understanding and agreed to proceed.      6. Follow up:   F/u in 4 weeks.     TREATMENT PLAN/GOALS: Continue supportive psychotherapy efforts and medications as indicated. Treatment and medication options discussed during today's visit. Patient ackowledged and verbally consented to continue with current treatment plan and was educated on the importance of compliance with treatment and follow-up appointments.    MEDICATION ISSUES:  LAI reviewed as expected.  Discussed medication options and treatment plan of prescribed medication as well as the risks, benefits, and side effects including potential falls, possible impaired driving and metabolic adversities among others. Patient is agreeable to call the office with any worsening of symptoms or onset of side effects. Patient is agreeable to call 911 or go to the nearest ER should he/she begin having SI/HI. No medication side effects or related complaints today.       22 minutes of supportive psychotherapy with goal to strengthen defenses, promote problems solving, restore adaptive functioning and provide symptom relief. The therapeutic alliance was strengthened to encourage the patient to express their thoughts and feelings. Esteem building was enhanced through praise, reassurance, normalizing and encouragement. Coping skills were enhanced using mindfulness (deep  breathing, progressive muscle relaxation, imagery, grounding & meditation) and cognitive behavioral strategies (reviewing cognitive distortions, negative self-talk/thought stopping and cognitive restructuring, through de-catastrophizing and examining options/alternatives) to build distress tolerance skills and emotional regulation.  Patient encouraged to practice negative thought stopping, in which the patient recognizes negative thoughts early and attempts to replace these thoughts with positive thoughts. Patient given education on medication side effects, diagnosis/illness and relapse symptoms. Plan to continue supportive psychotherapy in next appointment to provide symptom relief.             This document has been electronically signed by Mercedes Toro PA-C  April 15, 2022 13:42 EDT      Part of this note may be an electronic transcription/translation of spoken language to printed text using the Dragon Dictation System.

## 2022-04-15 ENCOUNTER — TELEMEDICINE (OUTPATIENT)
Dept: BEHAVIORAL HEALTH | Facility: CLINIC | Age: 42
End: 2022-04-15

## 2022-04-15 DIAGNOSIS — F41.1 GENERALIZED ANXIETY DISORDER: Primary | ICD-10-CM

## 2022-04-15 DIAGNOSIS — F50.01 ANOREXIA NERVOSA, RESTRICTING TYPE: ICD-10-CM

## 2022-04-15 DIAGNOSIS — G47.00 INSOMNIA, UNSPECIFIED TYPE: ICD-10-CM

## 2022-04-15 PROCEDURE — 90833 PSYTX W PT W E/M 30 MIN: CPT | Performed by: PHYSICIAN ASSISTANT

## 2022-04-15 PROCEDURE — 99214 OFFICE O/P EST MOD 30 MIN: CPT | Performed by: PHYSICIAN ASSISTANT

## 2022-04-15 RX ORDER — ONDANSETRON HYDROCHLORIDE 8 MG/1
TABLET, FILM COATED ORAL
COMMUNITY
Start: 2022-03-30 | End: 2022-05-10

## 2022-04-15 RX ORDER — SERTRALINE HYDROCHLORIDE 100 MG/1
150 TABLET, FILM COATED ORAL DAILY
Qty: 45 TABLET | Refills: 1 | Status: SHIPPED | OUTPATIENT
Start: 2022-04-15 | End: 2022-05-10

## 2022-04-15 RX ORDER — BETAMETHASONE DIPROPIONATE 0.5 MG/G
CREAM TOPICAL
COMMUNITY
Start: 2022-03-30 | End: 2022-12-13

## 2022-04-15 RX ORDER — APREPITANT 40 MG/1
CAPSULE ORAL
COMMUNITY
Start: 2022-03-30 | End: 2022-05-10

## 2022-04-15 RX ORDER — METOCLOPRAMIDE 10 MG/1
TABLET ORAL
COMMUNITY
Start: 2022-04-14 | End: 2022-04-15

## 2022-04-15 RX ORDER — LINEZOLID 600 MG/1
600 TABLET, FILM COATED ORAL EVERY 12 HOURS
COMMUNITY
Start: 2022-04-11 | End: 2022-05-10

## 2022-04-15 RX ORDER — CYCLOBENZAPRINE HCL 10 MG
TABLET ORAL
COMMUNITY
Start: 2022-04-14 | End: 2022-06-29

## 2022-04-15 RX ORDER — TRAZODONE HYDROCHLORIDE 50 MG/1
TABLET ORAL
Qty: 60 TABLET | Refills: 1 | Status: SHIPPED | OUTPATIENT
Start: 2022-04-15 | End: 2022-05-25

## 2022-04-15 RX ORDER — LEVOFLOXACIN 750 MG/1
TABLET ORAL
COMMUNITY
Start: 2022-04-11 | End: 2022-05-10

## 2022-04-15 RX ORDER — PROPRANOLOL HYDROCHLORIDE 40 MG/1
40 TABLET ORAL 2 TIMES DAILY PRN
Qty: 60 TABLET | Refills: 1 | Status: SHIPPED | OUTPATIENT
Start: 2022-04-15 | End: 2022-05-10

## 2022-04-15 RX ORDER — ONDANSETRON 8 MG/1
TABLET, ORALLY DISINTEGRATING ORAL
COMMUNITY
Start: 2022-04-14 | End: 2022-04-15

## 2022-04-15 RX ORDER — LORAZEPAM 0.5 MG/1
TABLET ORAL
COMMUNITY
Start: 2022-03-30 | End: 2022-05-25

## 2022-04-15 RX ORDER — HYDROMORPHONE HYDROCHLORIDE 2 MG/1
2 TABLET ORAL EVERY 4 HOURS PRN
COMMUNITY
Start: 2022-04-04 | End: 2022-05-10

## 2022-04-15 RX ORDER — PROMETHAZINE HYDROCHLORIDE 12.5 MG/1
TABLET ORAL
COMMUNITY
Start: 2022-04-14 | End: 2022-05-10

## 2022-04-20 ENCOUNTER — TELEPHONE (OUTPATIENT)
Dept: INTERNAL MEDICINE | Facility: CLINIC | Age: 42
End: 2022-04-20

## 2022-04-20 NOTE — TELEPHONE ENCOUNTER
Per Rosa Ellison on [employer] message:     We can send over Diflucan 150 mg once today, then repeat in 5 days if symptoms or not resolved.  Dispense 2, no refills.  We can also call in a Magic mouthwash for her.  Since this is a compound medication we cannot send it over the computer.  Magic mouth wash--1:1:1:1 Benadryl, viscous lidocaine, nystatin, maalox, Sig-swish and spit 10ml qid before meals and at bedtime, disp 200ml, 1 refill    I called Diflucan in the medications to [facility].  I called the Magic Mouth called into [facility] due to [facility] was out of the viscous lidocaine.     Patient is aware.

## 2022-04-20 NOTE — TELEPHONE ENCOUNTER
Caller: Josiane Higuera    Relationship: Self    Best call back number: [phone]     If a prescription is needed, what is your preferred pharmacy and phone number: [facility]Y #[id number] - [place], [place] - [address] - [phone] [facility] [phone] FX     Additional notes: PATIENT REPORTS THE PER [employer] MESSAGE FROM Rosa Ellison APRN, SHE WAS SENDING IN DIFLUCAN AND A MOUTHWASH TO HER PHARMACY.   PHARMACY HAS NEVER RECEIVED, AND NOTHING LISTED IN [employer].   PATIENT REQUESTS MEDICATIONS BE SENT AS SOON AS POSSIBLE.

## 2022-05-02 ENCOUNTER — TELEPHONE (OUTPATIENT)
Dept: INTERNAL MEDICINE | Facility: CLINIC | Age: 42
End: 2022-05-02

## 2022-05-10 ENCOUNTER — TELEMEDICINE (OUTPATIENT)
Dept: BEHAVIORAL HEALTH | Facility: CLINIC | Age: 42
End: 2022-05-10

## 2022-05-10 DIAGNOSIS — F50.01 ANOREXIA NERVOSA, RESTRICTING TYPE: ICD-10-CM

## 2022-05-10 DIAGNOSIS — F41.1 GENERALIZED ANXIETY DISORDER: Primary | ICD-10-CM

## 2022-05-10 PROCEDURE — 99213 OFFICE O/P EST LOW 20 MIN: CPT | Performed by: PHYSICIAN ASSISTANT

## 2022-05-10 RX ORDER — METRONIDAZOLE 250 MG/1
TABLET ORAL
COMMUNITY
End: 2022-05-10

## 2022-05-10 RX ORDER — SULFAMETHOXAZOLE AND TRIMETHOPRIM 800; 160 MG/1; MG/1
TABLET ORAL
COMMUNITY
End: 2022-05-10

## 2022-05-10 RX ORDER — FLUOXETINE 20 MG/1
20 TABLET, FILM COATED ORAL DAILY
Qty: 30 TABLET | Refills: 2 | Status: SHIPPED | OUTPATIENT
Start: 2022-05-10 | End: 2022-05-25

## 2022-05-10 RX ORDER — PROPRANOLOL HYDROCHLORIDE 20 MG/1
TABLET ORAL
COMMUNITY
End: 2022-05-10

## 2022-05-10 RX ORDER — FLUCONAZOLE 150 MG/1
TABLET ORAL
COMMUNITY
Start: 2022-04-20 | End: 2022-05-10

## 2022-05-10 RX ORDER — METRONIDAZOLE 500 MG/1
TABLET ORAL
COMMUNITY
End: 2022-05-10

## 2022-05-10 RX ORDER — METOCLOPRAMIDE 10 MG/1
TABLET ORAL
COMMUNITY
End: 2022-05-10

## 2022-05-10 RX ORDER — OXYCODONE HYDROCHLORIDE AND ACETAMINOPHEN 5; 325 MG/1; MG/1
TABLET ORAL
COMMUNITY
Start: 2022-05-05 | End: 2022-07-08

## 2022-05-10 RX ORDER — PROPRANOLOL HYDROCHLORIDE 40 MG/1
40 TABLET ORAL 3 TIMES DAILY PRN
Qty: 90 TABLET | Refills: 1 | Status: SHIPPED | OUTPATIENT
Start: 2022-05-10 | End: 2022-06-07 | Stop reason: SDUPTHER

## 2022-05-10 NOTE — PROGRESS NOTES
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 patient on the need to continue monitoring for blood pressure.  Counseled the patient on nutrition and the need to consistently eat daily and to get enough nutrition.  Patient given resources for eating disorder clinics in [place].  Follow-up in 1 month.  Addressed all questions and concerns.    Visit Diagnoses:    ICD-10-CM ICD-9-CM   1. Generalized anxiety disorder  F41.1 300.02   2. Anorexia nervosa, restricting type  F50.01 307.1       PLAN:  1. Safety: No acute safety concerns at this time.   2. Therapy: Declines at this time.  3. Risk Assessment: Risk of self-harm acutely is moderate.  Risk factors include anxiety disorder, mood disorder, and recent psychosocial stressors (pandemic). Protective factors include no family history, denies access to guns/weapons, no present SI, no history of suicide attempts or self-harm in the past, minimal AODA, healthcare seeking, future orientation, willingness to engage in care.  Risk of self-harm chronically is also moderate, but could be further elevated in the event of treatment noncompliance and/or AODA.  4. Labs/Diagnostics Ordered:   No orders of the defined types were placed in this encounter.    5. Medications:   New  Medications Ordered This Visit   Medications   • FLUoxetine (PROzac) 20 MG tablet     Sig: Take 1 tablet by mouth Daily for 90 days.     Dispense:  30 tablet     Refill:  2   • propranolol (INDERAL) 40 MG tablet     Sig: Take 1 tablet by mouth 3 (Three) Times a Day As Needed (anxiety) for up to 30 days.     Dispense:  90 tablet     Refill:  1       Discussed all risks, benefits, alternatives, and side effects of Trazodone including but not limited to GI upset, sexual dysfunction, dizziness, headache, nervousness, bleeding risk, seizure risk, sedation, headache, activation of elena or hypomania, increased fragility fracture risk, cardiac arrhythmias, priapism, hyponatremia, ocular effects, prolonged QT interval, withdrawal syndrome following abrupt discontinuation, serotonin syndrome, and activation of suicidal ideation and behavior.  Pt educated on the need to practice safe sex while taking this med. Discussed the need for pt to immediately call the office for any new or worsening symptoms, such as worsening depression; feeling nervous or restless; suicidal thoughts or actions; or other changes changes in mood or behavior, and all other concerns. Pt educated on med compliance and the risks of suddenly stopping this medication or missing doses. Pt verbalized understanding and is agreeable to taking Trazodone. Addressed all questions and concerns.     Propranolol, Risks, benefits, alternatives discussed with patient including dizziness, sedation, falls, low blood pressure, low heart rate, possible exacerbation of asthma.  After discussion of these risks and benefits, the patient voiced understanding and agreed to proceed.    Discussed all risks, benefits, alternatives, and side effects of Fluoxetine including but not limited to GI upset, decreased appetite, sexual dysfunction, bleeding risk, seizure risk, insomnia, anxiety, drowsiness, headache, asthenia, tremor, nervousness, activation of elena or hypomania,  increased fragility fracture risk, hyponatremia, ocular effects, withdrawal syndrome following abrupt discontinuation, serotonin syndrome, hypersensitivity reaction, and activation of suicidal ideation and behavior.  Pt educated on the need to practice safe sex while taking this med. Discussed the need for pt to immediately call the office for any new or worsening symptoms, such as worsening depression; feeling nervous or restless; suicidal thoughts or actions; or other changes changes in mood or behavior, and all other concerns. Pt educated on med compliance and the risks of suddenly stopping this medication or missing doses. Pt verbalized understanding and is agreeable to taking Fluoxetine. Addressed all questions and concerns.       6. Follow up:   F/u in 1 month.    TREATMENT PLAN/GOALS: Continue supportive psychotherapy efforts and medications as indicated. Treatment and medication options discussed during today's visit. Patient ackowledged and verbally consented to continue with current treatment plan and was educated on the importance of compliance with treatment and follow-up appointments.    MEDICATION ISSUES:  LAI reviewed as expected.  Discussed medication options and treatment plan of prescribed medication as well as the risks, benefits, and side effects including potential falls, possible impaired driving and metabolic adversities among others. Patient is agreeable to call the office with any worsening of symptoms or onset of side effects. Patient is agreeable to call 911 or go to the nearest ER should he/she begin having SI/HI. No medication side effects or related complaints today.              This document has been electronically signed by Mercedes Toro PA-C  May 10, 2022 09:36 EDT      Part of this note may be an electronic transcription/translation of spoken language to printed text using the Dragon Dictation System.

## 2022-05-13 DIAGNOSIS — F41.1 GENERALIZED ANXIETY DISORDER: ICD-10-CM

## 2022-05-13 RX ORDER — PROPRANOLOL HYDROCHLORIDE 40 MG/1
TABLET ORAL
Qty: 60 TABLET | Refills: 1 | OUTPATIENT
Start: 2022-05-13

## 2022-05-25 ENCOUNTER — OFFICE VISIT (OUTPATIENT)
Dept: INTERNAL MEDICINE | Facility: CLINIC | Age: 42
End: 2022-05-25

## 2022-05-25 VITALS
OXYGEN SATURATION: 98 % | DIASTOLIC BLOOD PRESSURE: 74 MMHG | HEIGHT: 61 IN | SYSTOLIC BLOOD PRESSURE: 106 MMHG | BODY MASS INDEX: 27.3 KG/M2 | WEIGHT: 144.6 LBS | TEMPERATURE: 98.5 F | HEART RATE: 102 BPM | RESPIRATION RATE: 20 BRPM

## 2022-05-25 DIAGNOSIS — R05.9 COUGH: Primary | ICD-10-CM

## 2022-05-25 DIAGNOSIS — J01.10 ACUTE NON-RECURRENT FRONTAL SINUSITIS: ICD-10-CM

## 2022-05-25 PROCEDURE — 99214 OFFICE O/P EST MOD 30 MIN: CPT | Performed by: PHYSICIAN ASSISTANT

## 2022-05-25 RX ORDER — AZITHROMYCIN 250 MG/1
TABLET, FILM COATED ORAL
Qty: 6 TABLET | Refills: 0 | Status: SHIPPED | OUTPATIENT
Start: 2022-05-25 | End: 2022-06-29

## 2022-05-25 RX ORDER — ALBUTEROL SULFATE 90 UG/1
2 AEROSOL, METERED RESPIRATORY (INHALATION) EVERY 4 HOURS PRN
Qty: 8 G | Refills: 2 | Status: SHIPPED | OUTPATIENT
Start: 2022-05-25 | End: 2022-06-29

## 2022-05-25 NOTE — ASSESSMENT & PLAN NOTE
Since symptoms have been going on for about a week they could still be viral etiology at this point.  Would recommend patient doing nasal saline flush and Flonase twice a day to help with inflammation and mucus production.  Would like to hold off on Augmentin or other antibiotics at this time since patient has been on broad-spectrum antibiotics within the last month.  If symptoms persist or worsen call or return.

## 2022-05-25 NOTE — PROGRESS NOTES
"Chief Complaint  Sinusitis (Pt feels pretty sure that she has a sinus infection, green nasal drainage, ears feel like she is under water)    Subjective          Josiane Higuera presents to [facility]  Sinus pressure and congestion- symptoms started about a week ago after children had been sick with a cold.  She has had some ear fullness.  She has been taking mucinex, zyxal without improvement. She has been on zybox and levaquin, bactrim and flagyl over the last month for infected abdominoplasty.        Objective   Vital Signs:   /74 (BP Location: Left arm, Patient Position: Sitting, Cuff Size: Adult)   Pulse 102   Temp 98.5 °F (36.9 °C) (Oral)   Resp 20   Ht 154.9 cm (61\")   Wt 65.6 kg (144 lb 9.6 oz)   SpO2 98%   BMI 27.32 kg/m²     Physical Exam  Vitals reviewed.   Constitutional:       Appearance: Normal appearance. She is well-developed.   HENT:      Head: Normocephalic and atraumatic.      Comments: Frontal and maxillary sinus tenderness     Right Ear: Tympanic membrane, ear canal and external ear normal.      Left Ear: Tympanic membrane, ear canal and external ear normal.      Nose: Nose normal. No congestion.      Mouth/Throat:      Mouth: Mucous membranes are moist.      Pharynx: No oropharyngeal exudate or posterior oropharyngeal erythema.   Eyes:      Conjunctiva/sclera: Conjunctivae normal.      Pupils: Pupils are equal, round, and reactive to light.   Cardiovascular:      Rate and Rhythm: Normal rate and regular rhythm.      Heart sounds: No murmur heard.    No friction rub. No gallop.   Pulmonary:      Effort: Pulmonary effort is normal.      Breath sounds: Normal breath sounds. No wheezing or rhonchi.   Skin:     General: Skin is warm and dry.   Neurological:      Mental Status: She is alert and oriented to person, place, and time.      Cranial Nerves: No cranial nerve deficit.   Psychiatric:         Mood and Affect: Mood and affect normal.    " "     Behavior: Behavior normal.         Thought Content: Thought content normal.         Judgment: Judgment normal.        Result Review :          Procedures      Assessment and Plan {CC Problem List  Visit Diagnosis   ROS  Review (Popup)  [facility] Maintenance  Quality  BestPractice  Medications  SmartSets  SnapShot Encounters  Media :23}   Diagnoses and all orders for this visit:    1. Cough (Primary)  Assessment & Plan:  Wheezing heard in right lower lobe, patient with history of pneumonia.  Chest x-ray done in office showing \"Mild increased opacity is seen posterior to the heart on the lateral possibly left lower lobe.    This could reflect atelectasis or pneumonia.  No other acute finding or change\". Since patient is afebrile with stable vitals, will treat as atelectasis at this time but will send in azithromycin to cover atypical and help with inflammation. Will also send in albuterol to use for wheezing.  Discussed importance of taking deep breaths to help treat atelectasis.  Could consider CT of chest if symptoms persist or worsen.    Orders:  -     XR Chest PA & Lateral (In Office)    2. Acute non-recurrent frontal sinusitis  Assessment & Plan:  Since symptoms have been going on for about a week they could still be viral etiology at this point.  Would recommend patient doing nasal saline flush and Flonase twice a day to help with inflammation and mucus production.  Would like to hold off on Augmentin or other antibiotics at this time since patient has been on broad-spectrum antibiotics within the last month.  If symptoms persist or worsen call or return.      Other orders  -     azithromycin (Zithromax Z-Balta) 250 MG tablet; Take 2 tablets by mouth on day 1, then 1 tablet daily on days 2-5  Dispense: 6 tablet; Refill: 0  -     albuterol sulfate  (90 Base) MCG/ACT inhaler; Inhale 2 puffs Every 4 (Four) Hours As Needed for Wheezing.  Dispense: 8 g; Refill: 2            Follow Up   Return if " symptoms worsen or fail to improve.  Patient was given instructions and counseling regarding her condition or for health maintenance advice. Please see specific information pulled into the AVS if appropriate.

## 2022-05-25 NOTE — ASSESSMENT & PLAN NOTE
"Wheezing heard in right lower lobe, patient with history of pneumonia.  Chest x-ray done in office showing \"Mild increased opacity is seen posterior to the heart on the lateral possibly left lower lobe.    This could reflect atelectasis or pneumonia.  No other acute finding or change\". Since patient is afebrile with stable vitals, will treat as atelectasis at this time but will send in azithromycin to cover atypical and help with inflammation. Will also send in albuterol to use for wheezing.  Discussed importance of taking deep breaths to help treat atelectasis.  Could consider CT of chest if symptoms persist or worsen.  "

## 2022-06-01 ENCOUNTER — TELEPHONE (OUTPATIENT)
Dept: INTERNAL MEDICINE | Facility: CLINIC | Age: 42
End: 2022-06-01

## 2022-06-01 NOTE — TELEPHONE ENCOUNTER
Patient still has a cough and is still productive, unsure if there is color to it. Patient states she is ok right now and does not need an appointment. Patient was treated with a zpak.      ----- Message from EDSON Johnston sent at 5/31/2022  3:11 PM EDT -----  Just reviewed imaging from 5/24 from other provider indicating pneumonia. Appears that discussion was had with the patient and no symptoms at that time. Please call the patient and make sure she is still feeling well. Please schedule appt if she has concerns.

## 2022-06-06 ENCOUNTER — OFFICE VISIT (OUTPATIENT)
Dept: INTERNAL MEDICINE | Facility: CLINIC | Age: 42
End: 2022-06-06

## 2022-06-06 VITALS
TEMPERATURE: 97.3 F | HEART RATE: 64 BPM | WEIGHT: 144.6 LBS | HEIGHT: 61 IN | BODY MASS INDEX: 27.3 KG/M2 | RESPIRATION RATE: 18 BRPM | OXYGEN SATURATION: 98 % | DIASTOLIC BLOOD PRESSURE: 62 MMHG | SYSTOLIC BLOOD PRESSURE: 104 MMHG

## 2022-06-06 DIAGNOSIS — R10.84 GENERALIZED ABDOMINAL PAIN: ICD-10-CM

## 2022-06-06 DIAGNOSIS — R19.7 DIARRHEA, UNSPECIFIED TYPE: Primary | ICD-10-CM

## 2022-06-06 PROBLEM — Z34.80 PRENATAL CARE OF MULTIGRAVIDA, ANTEPARTUM: Status: RESOLVED | Noted: 2017-10-11 | Resolved: 2022-06-06

## 2022-06-06 PROCEDURE — 87493 C DIFF AMPLIFIED PROBE: CPT | Performed by: NURSE PRACTITIONER

## 2022-06-06 PROCEDURE — 99214 OFFICE O/P EST MOD 30 MIN: CPT | Performed by: NURSE PRACTITIONER

## 2022-06-06 PROCEDURE — 87505 NFCT AGENT DETECTION GI: CPT | Performed by: NURSE PRACTITIONER

## 2022-06-06 RX ORDER — VANCOMYCIN HYDROCHLORIDE 125 MG/1
125 CAPSULE ORAL 4 TIMES DAILY
Qty: 40 CAPSULE | Refills: 0 | Status: SHIPPED | OUTPATIENT
Start: 2022-06-06 | End: 2022-06-16

## 2022-06-06 RX ORDER — LEVOCETIRIZINE DIHYDROCHLORIDE 5 MG/1
5 TABLET, FILM COATED ORAL 2 TIMES DAILY
COMMUNITY
End: 2022-06-06 | Stop reason: SDUPTHER

## 2022-06-06 RX ORDER — LEVOCETIRIZINE DIHYDROCHLORIDE 5 MG/1
5 TABLET, FILM COATED ORAL 2 TIMES DAILY
Qty: 180 TABLET | Refills: 0 | Status: SHIPPED | OUTPATIENT
Start: 2022-06-06 | End: 2022-08-15

## 2022-06-06 NOTE — PROGRESS NOTES
"Chief Complaint  Fever, Abdominal Pain, and Diarrhea    Subjective          Josiane Higuera presents to [facility]  History of Present Illness  I received a call from Dr. Watt today regarding concerns for the patient having diarrhea and fever. She was concerned regarding recent antibiotic treatment with wound care. Dr. Watt reports wound does not appear to be infected but she is concerned about possible cdiff    We contacted the patient today and she was able to come in for an appt. She reports diarrhea for several weeks and onset of abdominal pain  She reports watery diarrhea in the last week. She is having abdominal cramping routinely  101.1 fever this morning. Improved with tylenol  Otherwise she reports feeling good    She has recently been on zyvox, levaquin, bactrim, flagyl, and then zpack  She is no longer on antibiotics in the last week.     Abdominoplasty April 1, revision on flank and abdomen since that time    Objective   Vital Signs:   /62 (BP Location: Left arm, Patient Position: Sitting, Cuff Size: Adult)   Pulse 64   Temp 97.3 °F (36.3 °C)   Resp 18   Ht 154.9 cm (61\")   Wt 65.6 kg (144 lb 9.6 oz)   SpO2 98%   BMI 27.32 kg/m²     Physical Exam  Vitals and nursing note reviewed.   Constitutional:       General: She is not in acute distress.     Appearance: Normal appearance.   HENT:      Head: Normocephalic and atraumatic.      Right Ear: External ear normal.      Left Ear: External ear normal.      Nose: Nose normal.      Mouth/Throat:      Mouth: Mucous membranes are moist.   Eyes:      Conjunctiva/sclera: Conjunctivae normal.   Cardiovascular:      Rate and Rhythm: Normal rate and regular rhythm.      Pulses: Normal pulses.      Heart sounds: Normal heart sounds. No murmur heard.    No friction rub. No gallop.   Pulmonary:      Effort: Pulmonary effort is normal. No respiratory distress.      Breath sounds: No wheezing, rhonchi or rales. "   Abdominal:      General: There is no distension.      Palpations: Abdomen is soft. There is no mass.      Tenderness: There is abdominal tenderness (generalized).   Musculoskeletal:      Cervical back: Neck supple.      Right lower leg: No edema.      Left lower leg: No edema.   Skin:     General: Skin is warm and dry.   Neurological:      General: No focal deficit present.      Mental Status: She is alert and oriented to person, place, and time.   Psychiatric:         Mood and Affect: Mood normal.         Behavior: Behavior normal.        Result Review :          Procedures      Assessment and Plan    Diagnoses and all orders for this visit:    1. Diarrhea, unspecified type (Primary)  Comments:  concern for cdiff given recent abx use. stool culture obtained during visit. starting vanc today. cont probiotic. she will call with new or worsening sx  Orders:  -     Stool Culture (Reference Lab) - Stool, Per Rectum  -     Clostridium Difficile Toxin, PCR - Stool, Per Rectum    2. Generalized abdominal pain    Other orders  -     levocetirizine (XYZAL) 5 MG tablet; Take 1 tablet by mouth 2 (Two) Times a Day for 90 days.  Dispense: 180 tablet; Refill: 0  -     vancomycin (VANCOCIN) 125 MG capsule; Take 1 capsule by mouth 4 (Four) Times a Day for 10 days.  Dispense: 40 capsule; Refill: 0            I spent 30 minutes caring for Josiane on this date of service. This time includes time spent by me in the following activities:preparing for the visit, reviewing tests, performing a medically appropriate examination and/or evaluation , counseling and educating the patient/family/caregiver, ordering medications, tests, or procedures, referring and communicating with other health care professionals  and documenting information in the medical record  Follow Up   Return in about 1 week (around 6/13/2022).  Patient was given instructions and counseling regarding her condition or for health maintenance advice. Please see specific  information pulled into the AVS if appropriate.

## 2022-06-06 NOTE — PROGRESS NOTES
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  G47.00 780.52       PLAN:  1. Safety: No acute safety concerns at this time.   2. Therapy: Will refer to psychotherapy to Komal Espinosa LCSW.  3. Risk Assessment: Risk of self-harm acutely is moderate.  Risk factors include anxiety disorder, mood disorder, and recent psychosocial stressors (pandemic). Protective factors include no family history, denies access to guns/weapons, no present SI, no history of suicide attempts or self-harm in the past, minimal AODA, healthcare seeking, future orientation, willingness to engage in care.  Risk of self-harm chronically is also moderate, but could be further elevated in the event of treatment noncompliance and/or AODA.  4. Labs/Diagnostics Ordered:   Orders Placed This Encounter   Procedures   • Ambulatory Referral to Psychotherapy     5. Medications:   New Medications Ordered This Visit   Medications   • citalopram (CeleXA) 10 MG tablet     Sig: Take 1 tab PO QD x2 weeks, if tolerated can increase to 2 tab PO QD     Dispense:  60 tablet     Refill:  1   • propranolol (INDERAL) 40 MG tablet     Sig: Take 1 tablet by mouth 3 (Three) Times a Day As Needed (anxiety) for up to 30 days.     Dispense:  90 tablet     Refill:  1        Discussed all risks, benefits, alternatives, and side effects of Trazodone including but not limited to GI upset, sexual dysfunction, dizziness, headache, nervousness, bleeding risk, seizure risk, sedation, headache, activation of elena or hypomania, increased fragility fracture risk, cardiac arrhythmias, priapism, hyponatremia, ocular effects, prolonged QT interval, withdrawal syndrome following abrupt discontinuation, serotonin syndrome, and activation of suicidal ideation and behavior.  Pt educated on the need to practice safe sex while taking this med. Discussed the need for pt to immediately call the office for any new or worsening symptoms, such as worsening depression; feeling nervous or restless; suicidal thoughts or actions; or other changes changes in mood or behavior, and all other concerns. Pt educated on med compliance and the risks of suddenly stopping this medication or missing doses. Pt verbalized understanding and is agreeable to taking Trazodone. Addressed all questions and concerns.     Propranolol, Risks, benefits, alternatives discussed with patient including dizziness, sedation, falls, low blood pressure, low heart rate, possible exacerbation of asthma.  After discussion of these risks and benefits, the patient voiced understanding and agreed to proceed.    Discussed all risks, benefits, alternatives, and side effects of Celexa including but not limited to GI upset, decreased appetite, sexual dysfunction, bleeding risk, seizure risk, insomnia, anxiety, drowsiness, headache, asthenia, tremor, nervousness, activation of elena or hypomania, increased fragility fracture risk, hyponatremia, ocular effects, withdrawal syndrome following abrupt discontinuation, serotonin syndrome, hypersensitivity reaction, and activation of suicidal ideation and behavior.  Pt educated on the need to practice safe sex while taking this med. Discussed the need for pt to immediately call the office for any new or  worsening symptoms, such as worsening depression; feeling nervous or restless; suicidal thoughts or actions; or other changes changes in mood or behavior, and all other concerns. Pt educated on med compliance and the risks of suddenly stopping this medication or missing doses. Pt verbalized understanding and is agreeable to taking Celexa. Addressed all questions and concerns.       6. Follow up:   F/u in 1 month.    TREATMENT PLAN/GOALS: Continue supportive psychotherapy efforts and medications as indicated. Treatment and medication options discussed during today's visit. Patient ackowledged and verbally consented to continue with current treatment plan and was educated on the importance of compliance with treatment and follow-up appointments.    MEDICATION ISSUES:  LAI reviewed as expected.  Discussed medication options and treatment plan of prescribed medication as well as the risks, benefits, and side effects including potential falls, possible impaired driving and metabolic adversities among others. Patient is agreeable to call the office with any worsening of symptoms or onset of side effects. Patient is agreeable to call 911 or go to the nearest ER should he/she begin having SI/HI. No medication side effects or related complaints today.              This document has been electronically signed by Mercedes Toro PA-C  June 7, 2022 08:54 EDT      Part of this note may be an electronic transcription/translation of spoken language to printed text using the Dragon Dictation System.

## 2022-06-07 ENCOUNTER — TELEMEDICINE (OUTPATIENT)
Dept: BEHAVIORAL HEALTH | Facility: CLINIC | Age: 42
End: 2022-06-07

## 2022-06-07 DIAGNOSIS — F50.01 ANOREXIA NERVOSA, RESTRICTING TYPE: ICD-10-CM

## 2022-06-07 DIAGNOSIS — G47.00 INSOMNIA, UNSPECIFIED TYPE: ICD-10-CM

## 2022-06-07 DIAGNOSIS — F41.1 GENERALIZED ANXIETY DISORDER: Primary | ICD-10-CM

## 2022-06-07 LAB
027 TOXIN: ABNORMAL
C COLI+JEJ+UPSA DNA STL QL NAA+NON-PROBE: NOT DETECTED
C DIFF TOX GENS STL QL NAA+PROBE: POSITIVE
EC STX1+STX2 GENES STL QL NAA+NON-PROBE: NOT DETECTED
S ENT+BONG DNA STL QL NAA+NON-PROBE: NOT DETECTED
SHIGELLA SP+EIEC IPAH ST NAA+NON-PROBE: NOT DETECTED

## 2022-06-07 PROCEDURE — 99213 OFFICE O/P EST LOW 20 MIN: CPT | Performed by: PHYSICIAN ASSISTANT

## 2022-06-07 RX ORDER — PROPRANOLOL HYDROCHLORIDE 40 MG/1
40 TABLET ORAL 3 TIMES DAILY PRN
Qty: 90 TABLET | Refills: 1 | Status: SHIPPED | OUTPATIENT
Start: 2022-06-07 | End: 2022-07-01

## 2022-06-07 RX ORDER — CITALOPRAM 10 MG/1
TABLET ORAL
Qty: 60 TABLET | Refills: 1 | Status: SHIPPED | OUTPATIENT
Start: 2022-06-07 | End: 2022-07-08

## 2022-06-15 DIAGNOSIS — G43.909 MIGRAINE WITHOUT STATUS MIGRAINOSUS, NOT INTRACTABLE, UNSPECIFIED MIGRAINE TYPE: ICD-10-CM

## 2022-06-15 RX ORDER — TOPIRAMATE 50 MG/1
150 TABLET, FILM COATED ORAL 2 TIMES DAILY
Qty: 540 TABLET | Refills: 0 | Status: SHIPPED | OUTPATIENT
Start: 2022-06-15 | End: 2023-01-10

## 2022-06-16 ENCOUNTER — PRIOR AUTHORIZATION (OUTPATIENT)
Dept: INTERNAL MEDICINE | Facility: CLINIC | Age: 42
End: 2022-06-16

## 2022-06-29 ENCOUNTER — OFFICE VISIT (OUTPATIENT)
Dept: INTERNAL MEDICINE | Facility: CLINIC | Age: 42
End: 2022-06-29

## 2022-06-29 VITALS
HEART RATE: 59 BPM | BODY MASS INDEX: 26.81 KG/M2 | TEMPERATURE: 98.5 F | HEIGHT: 61 IN | RESPIRATION RATE: 20 BRPM | OXYGEN SATURATION: 99 % | WEIGHT: 142 LBS | DIASTOLIC BLOOD PRESSURE: 66 MMHG | SYSTOLIC BLOOD PRESSURE: 108 MMHG

## 2022-06-29 DIAGNOSIS — A04.72 C. DIFFICILE DIARRHEA: Primary | ICD-10-CM

## 2022-06-29 DIAGNOSIS — R11.0 NAUSEA: ICD-10-CM

## 2022-06-29 PROCEDURE — 99213 OFFICE O/P EST LOW 20 MIN: CPT | Performed by: PHYSICIAN ASSISTANT

## 2022-06-29 RX ORDER — TOPIRAMATE 100 MG/1
TABLET, FILM COATED ORAL
COMMUNITY
End: 2022-09-22

## 2022-06-29 RX ORDER — VANCOMYCIN HYDROCHLORIDE 125 MG/1
CAPSULE ORAL
Qty: 82 CAPSULE | Refills: 0 | Status: SHIPPED | OUTPATIENT
Start: 2022-06-29 | End: 2022-12-13

## 2022-06-29 RX ORDER — VANCOMYCIN HYDROCHLORIDE 250 MG/1
CAPSULE ORAL
COMMUNITY
Start: 2022-06-26 | End: 2022-06-29 | Stop reason: SDUPTHER

## 2022-06-29 RX ORDER — ONDANSETRON 4 MG/1
4 TABLET, FILM COATED ORAL EVERY 8 HOURS PRN
Qty: 20 TABLET | Refills: 0 | Status: SHIPPED | OUTPATIENT
Start: 2022-06-29 | End: 2022-12-13

## 2022-06-29 RX ORDER — SODIUM HYPOCHLORITE 1.25 MG/ML
SOLUTION TOPICAL
COMMUNITY
Start: 2022-06-20
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
## 2022-06-30 PROBLEM — A04.72 C. DIFFICILE DIARRHEA: Status: ACTIVE | Noted: 2022-06-30

## 2022-06-30 PROBLEM — J18.9 PNEUMONIA OF RIGHT MIDDLE LOBE DUE TO INFECTIOUS ORGANISM: Chronic | Status: RESOLVED | Noted: 2021-11-11 | Resolved: 2022-06-30

## 2022-06-30 PROBLEM — R05.9 COUGH: Status: RESOLVED | Noted: 2022-05-25 | Resolved: 2022-06-30

## 2022-06-30 NOTE — ASSESSMENT & PLAN NOTE
Reviewed previous stool culture. Discussed ddx. Will start prolonged taper of abx. Encouraged to start probiotic to help gut health. If no improvement with abx, will return to clinic for repeat stool culture. Monitor for worsening sx, dehydration, fever. Pt understands and agrees

## 2022-07-01 DIAGNOSIS — F41.1 GENERALIZED ANXIETY DISORDER: ICD-10-CM

## 2022-07-01 RX ORDER — PROPRANOLOL HYDROCHLORIDE 40 MG/1
TABLET ORAL
Qty: 90 TABLET | Refills: 1 | Status: SHIPPED | OUTPATIENT
Start: 2022-07-01 | End: 2022-07-08 | Stop reason: SDUPTHER

## 2022-07-01 NOTE — TELEPHONE ENCOUNTER
SPOKE WITH PT PHARMACY AND THEY EDU THE LAST TIME PT HAD MED REFILL WAS IN MAY 2022, PHARMACY IS SAYING THEY NEVER RECEIVED THE SCRIPT THAT WAS SENT IN ON 06/07/2022, THIS IS AN SAM[facility] PT

## 2022-07-07 NOTE — PROGRESS NOTES
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 and overall mood.  We will continue trazodone at current dose for management of sleep as needed.  We will continue propranolol at 40 mg 3 times daily for management of anxiety.  Patient states she has been taking propranolol 3 times a day and is tolerating this well.  Discussed recent vital signs of blood pressure being 108/66 on 6/29/2022.  Patient states that is her normal and that her systolic BP is usually 100-112. Pt reports HR has been in the 70s as well. Advised to continue monitoring BP.  Keep upcoming scheduled appointment for therapy.  Follow-up in 1 month.  Addressed all questions and concerns.  Recommended journaling.  Recommended for patient to look into hospice grief counseling as well.      Visit Diagnoses:    ICD-10-CM ICD-9-CM   1. Generalized anxiety disorder  F41.1 300.02   2. Insomnia, unspecified type  G47.00 780.52   3. Anorexia nervosa, restricting type  F50.01 307.1       PLAN:  1. Safety: No acute safety concerns at this time.   2. Therapy: Will refer to psychotherapy to Komal Espinosa LCSW.  3. Risk Assessment: Risk of self-harm acutely is moderate.  Risk factors include anxiety disorder, mood disorder, and recent psychosocial  stressors (pandemic). Protective factors include no family history, denies access to guns/weapons, no present SI, no history of suicide attempts or self-harm in the past, minimal AODA, healthcare seeking, future orientation, willingness to engage in care.  Risk of self-harm chronically is also moderate, but could be further elevated in the event of treatment noncompliance and/or AODA.  4. Labs/Diagnostics Ordered:   No orders of the defined types were placed in this encounter.    5. Medications:   New Medications Ordered This Visit   Medications   • propranolol (INDERAL) 40 MG tablet     Sig: Take 1 tablet by mouth 3 (Three) Times a Day As Needed (anxiety) for up to 30 days.     Dispense:  90 tablet     Refill:  2   • traZODone (DESYREL) 50 MG tablet     Sig: Take 1-2 tab PO QHS PRN sleep     Dispense:  60 tablet     Refill:  3   • citalopram (CeleXA) 20 MG tablet     Sig: Take 1.5 tablets by mouth Daily for 30 days.     Dispense:  45 tablet     Refill:  3       Discussed all risks, benefits, alternatives, and side effects of Trazodone including but not limited to GI upset, sexual dysfunction, dizziness, headache, nervousness, bleeding risk, seizure risk, sedation, headache, activation of elena or hypomania, increased fragility fracture risk, cardiac arrhythmias, priapism, hyponatremia, ocular effects, prolonged QT interval, withdrawal syndrome following abrupt discontinuation, serotonin syndrome, and activation of suicidal ideation and behavior.  Pt educated on the need to practice safe sex while taking this med. Discussed the need for pt to immediately call the office for any new or worsening symptoms, such as worsening depression; feeling nervous or restless; suicidal thoughts or actions; or other changes changes in mood or behavior, and all other concerns. Pt educated on med compliance and the risks of suddenly stopping this medication or missing doses. Pt verbalized understanding and is agreeable to taking  Trazodone. Addressed all questions and concerns.     Propranolol, Risks, benefits, alternatives discussed with patient including dizziness, sedation, falls, low blood pressure, low heart rate, possible exacerbation of asthma.  After discussion of these risks and benefits, the patient voiced understanding and agreed to proceed.    Discussed all risks, benefits, alternatives, and side effects of Celexa including but not limited to GI upset, decreased appetite, sexual dysfunction, bleeding risk, seizure risk, insomnia, anxiety, drowsiness, headache, asthenia, tremor, nervousness, activation of elena or hypomania, increased fragility fracture risk, hyponatremia, ocular effects, withdrawal syndrome following abrupt discontinuation, serotonin syndrome, hypersensitivity reaction, and activation of suicidal ideation and behavior.  Pt educated on the need to practice safe sex while taking this med. Discussed the need for pt to immediately call the office for any new or worsening symptoms, such as worsening depression; feeling nervous or restless; suicidal thoughts or actions; or other changes changes in mood or behavior, and all other concerns. Pt educated on med compliance and the risks of suddenly stopping this medication or missing doses. Pt verbalized understanding and is agreeable to taking Celexa. Addressed all questions and concerns.       6. Follow up:   F/u in 1 month.    TREATMENT PLAN/GOALS: Continue supportive psychotherapy efforts and medications as indicated. Treatment and medication options discussed during today's visit. Patient ackowledged and verbally consented to continue with current treatment plan and was educated on the importance of compliance with treatment and follow-up appointments.    MEDICATION ISSUES:  LAI reviewed as expected.  Discussed medication options and treatment plan of prescribed medication as well as the risks, benefits, and side effects including potential falls, possible impaired  driving and metabolic adversities among others. Patient is agreeable to call the office with any worsening of symptoms or onset of side effects. Patient is agreeable to call 911 or go to the nearest ER should he/she begin having SI/HI. No medication side effects or related complaints today.              This document has been electronically signed by Mercedes Toro PA-C  July 8, 2022 08:50 EDT      Part of this note may be an electronic transcription/translation of spoken language to printed text using the Dragon Dictation System.

## 2022-07-08 ENCOUNTER — TELEMEDICINE (OUTPATIENT)
Dept: BEHAVIORAL HEALTH | Facility: CLINIC | Age: 42
End: 2022-07-08

## 2022-07-08 DIAGNOSIS — G47.00 INSOMNIA, UNSPECIFIED TYPE: ICD-10-CM

## 2022-07-08 DIAGNOSIS — F41.1 GENERALIZED ANXIETY DISORDER: Primary | ICD-10-CM

## 2022-07-08 DIAGNOSIS — F50.01 ANOREXIA NERVOSA, RESTRICTING TYPE: ICD-10-CM

## 2022-07-08 PROCEDURE — 99214 OFFICE O/P EST MOD 30 MIN: CPT | Performed by: PHYSICIAN ASSISTANT

## 2022-07-08 RX ORDER — CITALOPRAM 20 MG/1
30 TABLET ORAL DAILY
Qty: 45 TABLET | Refills: 3 | Status: SHIPPED | OUTPATIENT
Start: 2022-07-08 | End: 2022-08-12 | Stop reason: SDUPTHER

## 2022-07-08 RX ORDER — PROPRANOLOL HYDROCHLORIDE 40 MG/1
40 TABLET ORAL 3 TIMES DAILY PRN
Qty: 90 TABLET | Refills: 2 | Status: SHIPPED | OUTPATIENT
Start: 2022-07-08 | End: 2022-08-12 | Stop reason: SDUPTHER

## 2022-07-08 RX ORDER — TRAZODONE HYDROCHLORIDE 50 MG/1
TABLET ORAL
Qty: 60 TABLET | Refills: 3 | Status: SHIPPED | OUTPATIENT
Start: 2022-07-08 | End: 2022-08-12 | Stop reason: SDUPTHER

## 2022-07-08 RX ORDER — OXYCODONE HYDROCHLORIDE AND ACETAMINOPHEN 5; 325 MG/1; MG/1
TABLET ORAL EVERY 8 HOURS
COMMUNITY
End: 2023-01-10

## 2022-07-08 RX ORDER — VANCOMYCIN HYDROCHLORIDE 250 MG/1
CAPSULE ORAL
COMMUNITY
End: 2022-07-08

## 2022-07-08 RX ORDER — FLUOXETINE 20 MG/1
20 TABLET, FILM COATED ORAL DAILY
COMMUNITY
Start: 2022-07-02 | End: 2022-07-08
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
## 2022-07-18 ENCOUNTER — TELEMEDICINE (OUTPATIENT)
Dept: PSYCHIATRY | Facility: CLINIC | Age: 42
End: 2022-07-18

## 2022-07-18 DIAGNOSIS — F41.1 GENERALIZED ANXIETY DISORDER: Primary | ICD-10-CM

## 2022-07-18 PROCEDURE — 90837 PSYTX W PT 60 MINUTES: CPT | Performed by: SOCIAL WORKER
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
## 2022-08-08 ENCOUNTER — PATIENT MESSAGE (OUTPATIENT)
Dept: INTERNAL MEDICINE | Facility: CLINIC | Age: 42
End: 2022-08-08

## 2022-08-08 DIAGNOSIS — D64.9 ANEMIA, UNSPECIFIED TYPE: Primary | ICD-10-CM

## 2022-08-09 NOTE — TELEPHONE ENCOUNTER
From: Josiane Higuera  To: EDSON Johnston  Sent: 8/8/2022 10:33 PM EDT  Subject: Iron profile     Grady Lee,     Can you put in some labs to check my iron profile. I’ve been eating a lot of Ice and I had that huge surgery on April 1st. I’m super tired and have the chronic wounds I’m still fighting every day. I would appreciate it, thank you arpita Joshua

## 2022-08-12 ENCOUNTER — TELEMEDICINE (OUTPATIENT)
Dept: BEHAVIORAL HEALTH | Facility: CLINIC | Age: 42
End: 2022-08-12

## 2022-08-12 DIAGNOSIS — G47.00 INSOMNIA, UNSPECIFIED TYPE: ICD-10-CM

## 2022-08-12 DIAGNOSIS — F41.1 GENERALIZED ANXIETY DISORDER: Primary | ICD-10-CM

## 2022-08-12 DIAGNOSIS — F50.01 ANOREXIA NERVOSA, RESTRICTING TYPE: ICD-10-CM

## 2022-08-12 PROCEDURE — 99213 OFFICE O/P EST LOW 20 MIN: CPT | Performed by: PHYSICIAN ASSISTANT

## 2022-08-12 RX ORDER — PROPRANOLOL HYDROCHLORIDE 40 MG/1
40 TABLET ORAL 3 TIMES DAILY PRN
Qty: 90 TABLET | Refills: 2 | Status: SHIPPED | OUTPATIENT
Start: 2022-08-12 | End: 2022-12-13 | Stop reason: SDUPTHER

## 2022-08-12 RX ORDER — TRAZODONE HYDROCHLORIDE 50 MG/1
TABLET ORAL
Qty: 60 TABLET | Refills: 3 | Status: SHIPPED | OUTPATIENT
Start: 2022-08-12 | End: 2022-12-13

## 2022-08-12 RX ORDER — CITALOPRAM 20 MG/1
30 TABLET ORAL DAILY
Qty: 45 TABLET | Refills: 3 | Status: SHIPPED | OUTPATIENT
Start: 2022-08-12 | End: 2022-12-13

## 2022-08-12 NOTE — PROGRESS NOTES
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 and concerns.      Visit Diagnoses:    ICD-10-CM ICD-9-CM   1. Generalized anxiety disorder  F41.1 300.02   2. Anorexia nervosa, restricting type  F50.01 307.1   3. Insomnia, unspecified type  G47.00 780.52       PLAN:  1. Safety: No acute safety concerns at this time.   2. Therapy: Will refer to psychotherapy to Komal Espinosa LCSW.  3. Risk Assessment: Risk of self-harm acutely is moderate.  Risk factors include anxiety disorder, mood disorder, and recent psychosocial stressors (pandemic). Protective factors include no family history, denies access to guns/weapons, no present SI, no history of suicide attempts or self-harm in the past, minimal AODA, healthcare seeking, future orientation, willingness to engage in care.  Risk of self-harm chronically is also moderate, but could be further elevated in the event of treatment noncompliance and/or AODA.  4. Labs/Diagnostics Ordered:   No orders of the defined types were placed in this encounter.    5. Medications:   New Medications Ordered This Visit   Medications   • propranolol (INDERAL) 40 MG tablet     Sig: Take 1 tablet by mouth 3 (Three) Times a Day As Needed (anxiety) for up to 30 days.     Dispense:  90 tablet     Refill:  2   •  traZODone (DESYREL) 50 MG tablet     Sig: Take 1-2 tab PO QHS PRN sleep     Dispense:  60 tablet     Refill:  3   • citalopram (CeleXA) 20 MG tablet     Sig: Take 1.5 tablets by mouth Daily for 30 days.     Dispense:  45 tablet     Refill:  3       Discussed all risks, benefits, alternatives, and side effects of Trazodone including but not limited to GI upset, sexual dysfunction, dizziness, headache, nervousness, bleeding risk, seizure risk, sedation, headache, activation of elena or hypomania, increased fragility fracture risk, cardiac arrhythmias, priapism, hyponatremia, ocular effects, prolonged QT interval, withdrawal syndrome following abrupt discontinuation, serotonin syndrome, and activation of suicidal ideation and behavior.  Pt educated on the need to practice safe sex while taking this med. Discussed the need for pt to immediately call the office for any new or worsening symptoms, such as worsening depression; feeling nervous or restless; suicidal thoughts or actions; or other changes changes in mood or behavior, and all other concerns. Pt educated on med compliance and the risks of suddenly stopping this medication or missing doses. Pt verbalized understanding and is agreeable to taking Trazodone. Addressed all questions and concerns.     Propranolol, Risks, benefits, alternatives discussed with patient including dizziness, sedation, falls, low blood pressure, low heart rate, possible exacerbation of asthma.  After discussion of these risks and benefits, the patient voiced understanding and agreed to proceed.    Discussed all risks, benefits, alternatives, and side effects of Celexa including but not limited to GI upset, decreased appetite, sexual dysfunction, bleeding risk, seizure risk, insomnia, anxiety, drowsiness, headache, asthenia, tremor, nervousness, activation of elena or hypomania, increased fragility fracture risk, hyponatremia, ocular effects, withdrawal syndrome following abrupt  discontinuation, serotonin syndrome, hypersensitivity reaction, and activation of suicidal ideation and behavior.  Pt educated on the need to practice safe sex while taking this med. Discussed the need for pt to immediately call the office for any new or worsening symptoms, such as worsening depression; feeling nervous or restless; suicidal thoughts or actions; or other changes changes in mood or behavior, and all other concerns. Pt educated on med compliance and the risks of suddenly stopping this medication or missing doses. Pt verbalized understanding and is agreeable to taking Celexa. Addressed all questions and concerns.       6. Follow up:   F/u in 1 month.    TREATMENT PLAN/GOALS: Continue supportive psychotherapy efforts and medications as indicated. Treatment and medication options discussed during today's visit. Patient ackowledged and verbally consented to continue with current treatment plan and was educated on the importance of compliance with treatment and follow-up appointments.    MEDICATION ISSUES:  LAI reviewed as expected.  Discussed medication options and treatment plan of prescribed medication as well as the risks, benefits, and side effects including potential falls, possible impaired driving and metabolic adversities among others. Patient is agreeable to call the office with any worsening of symptoms or onset of side effects. Patient is agreeable to call 911 or go to the nearest ER should he/she begin having SI/HI. No medication side effects or related complaints today.              This document has been electronically signed by Mercedes Toro PA-C  August 12, 2022 08:18 EDT      Part of this note may be an electronic transcription/translation of spoken language to printed text using the Dragon Dictation System.

## 2022-08-15 RX ORDER — LEVOCETIRIZINE DIHYDROCHLORIDE 5 MG/1
TABLET, FILM COATED ORAL
Qty: 180 TABLET | Refills: 0 | Status: SHIPPED | OUTPATIENT
Start: 2022-08-15 | End: 2022-11-30

## 2022-09-12 ENCOUNTER — TRANSCRIBE ORDERS (OUTPATIENT)
Dept: ADMINISTRATIVE | Facility: HOSPITAL | Age: 42
End: 2022-09-12

## 2022-09-12 ENCOUNTER — HOSPITAL ENCOUNTER (OUTPATIENT)
Dept: GENERAL RADIOLOGY | Facility: HOSPITAL | Age: 42
Discharge: HOME | End: 2022-09-12
Admitting: NURSE PRACTITIONER

## 2022-09-12 DIAGNOSIS — M16.11 PRIMARY OSTEOARTHRITIS OF RIGHT HIP: ICD-10-CM

## 2022-09-12 DIAGNOSIS — M16.11 PRIMARY OSTEOARTHRITIS OF RIGHT HIP: Primary | ICD-10-CM

## 2022-09-12 PROCEDURE — 73502 X-RAY EXAM HIP UNI 2-3 VIEWS: CPT

## 2022-09-12 NOTE — PROGRESS NOTES
Progress Note    Date: September 16, 2022  Time In: 0756  Time Out: 0853    Patient Name: Josiane Higuera  Patient Age: 42 y.o.    Mode of visit: Video  Location of provider: [address], [place] [address].  Location of patient: Home    Patient is seen remotely using [employer]. Patient is being seen via telehealth and patient confirms that they are in a secure environment for this session. The patient's condition being diagnosed/treated is appropriate for telemedicine. The provider identified herself as well as her credentials. The patient gave consent to be seen remotely, and when consent is given they understand that the consent allows for patient identifiable information to be sent to a third party as needed. They may refuse to be seen remotely at any time. The electronic data is encrypted and password protected, and the patient has been advised of the potential risks to privacy not withstanding such measures. Patient consented to the use of video for the purpose of a telehealth session today. The visit included audio and video interaction. No technical issues occurred during this visit.      Subjective   History of Present Illness     From previous progress note on 7/18/22:  Patient was encouraged to continue exploring triggers to her mood changes as discussed in session.  She is to review communication styles and we will discuss this further next session.    Josiane Higuera is a 42 y.o. female who presents today as a follow-up for continued psychotherapy.  Patient reports that she continues to have intrusive thoughts which are disturbing to her (typically related to feeling like a burden on others).  Patient states that her anxiety has been high for the last couple of months due to circumstances involving her son (testing for ASD, intellectual abilities, etc.), and specific interpersonal conflicts with her family (mother, and siblings).      Assessment    Mental Status Exam     Appearance:  appeared to have good hygiene  Behavior: calm  Cooperation:  engaged, cooperative, attentive, and friendly  Eye Contact:  good  Affect:  congruent  Mood: anxious  Speech: responsive  Thought Process:  linear and organized  Thought Content: intrusive thoughts  Suicidal: denies  Homicidal:  denies  Hallucinations:  denies  Memory:  intact  Orientation:  person, place, time, and situation  Reliability:  reliable  Insight:  good  Judgement:  good     Clinical Intervention       ICD-10-CM ICD-9-CM   1. Generalized anxiety disorder  F41.1 300.02        Individual psychotherapy was provided utilizing ACT techniques to challenge negative thinking patterns, recognize cognitive distortions and identify communication style. Patient was educated on the 4 types of communication styles, and was given information regarding how to recognize the styles and to work towards assertive communication.  Patient acknowledged that she has typically always had a passive communication style, and it has been difficult to prioritize her own needs.  Patient states that having these conversations can make her anxious, as she typically likes to avoid conflict.    Plan   Plan & Goals     Patient could benefit from continuing to explore ways of becoming more assertive in her relationships, particularly with family members.  Patient could also benefit from learning cognitive defusion techniques in more detail, and we will discuss this further next session.    1. Patient acknowledged and verbally consented to continue working toward resolving current treatment plan goals and was educated on the importance of participation in the therapeutic process.  2. Patient will remain compliant with medication regimen as prescribed. Discuss any medication side effects, questions or concerns with prescribing provider.  3. Call 911 or present to the nearest emergency room in an emergency situation. National Suicide Prevention Lifeline: 1-147.786.8547.    Return in  about 2 weeks (around 9/30/2022) for Video visit.    ____________________  This document has been electronically signed by Komal Espinosa LCSW  September 16, 2022 09:01 EDT    Part of this note may be an electronic transcription/translation of spoken language to printed text using the Dragon Dictation System.

## 2022-09-16 ENCOUNTER — TELEMEDICINE (OUTPATIENT)
Dept: PSYCHIATRY | Facility: CLINIC | Age: 42
End: 2022-09-16

## 2022-09-16 ENCOUNTER — TELEPHONE (OUTPATIENT)
Dept: PSYCHIATRY | Facility: CLINIC | Age: 42
End: 2022-09-16

## 2022-09-16 DIAGNOSIS — F41.1 GENERALIZED ANXIETY DISORDER: Primary | ICD-10-CM

## 2022-09-16 PROCEDURE — 90837 PSYTX W PT 60 MINUTES: CPT | Performed by: SOCIAL WORKER

## 2022-09-16 NOTE — PSYCHOTHERAPY NOTE
Psychotherapy Note - September 16, 2022    LIVING SITUATION  6 son - Jeffrey  4 son - Geremias (ASD) - officially diagnosed, on the verge of being mentally retarded (will know for sure at 8 years old) - he's in     I carry a lot fuilt - he might have a genetic mutation - facial features  Genetic test was ordered - was approved    My boyfriend - Alan - helps bring me down from my emotions    I feel like we are going to be too much for  Alan  He's very logical  He's so laid back and nothing really bothers him  When I'm going off emotionally, he brings me back logically  Alan isn't going to do something he doesn't want to do    My best friend is like that too  The same personality - drawn because it balances me    My ex- - Harry - was unempathetic - it never mattered what I wanted  Nothing was ever addressed or talked about  Because I need a lot, I felt like I couldn't talk to him about things  everytime we do, he proves who he is    Most people never know that I'm feeling a certain way  Ezel behavior from my ex (no matter what I don't talk about it)    Coming into my 42th year, I knew there would be big changes  People see me as soft-spoken and a pushover    Communication styles:   I'm passive  Dad dying (hospice - neck/head cancer) - passive  Mom - passive-aggressive  Alan & friend - assertive  Ex- - aggressive  Sister & brother - passive-aggressive    I'm just like my dad  My sister and brother are just like my mom    I was so unhappy and I never should have  my ex-  He was very narcissistic - it was always about him - I tried to stay for the kids  I never wanted them to experience the divorce    I filed for divorce and he never believed that I would actually leave him  I put him through engineering school - I think he thought I wouldn't leave  I started working part-time  He built a house that I told him I didn't want and he did it anyway  Once all the lies came out in our  marriage, I never got the trust back    This is the healthiest relationship I have ever had  We don't have disagreements to the same level - it's just a healthy discussion    **cognitive defusion techniques

## 2022-09-22 ENCOUNTER — CLINICAL SUPPORT (OUTPATIENT)
Dept: INTERNAL MEDICINE | Facility: CLINIC | Age: 42
End: 2022-09-22

## 2022-09-22 DIAGNOSIS — D64.9 ANEMIA, UNSPECIFIED TYPE: ICD-10-CM

## 2022-09-22 LAB
ALBUMIN SERPL-MCNC: 4.2 G/DL (ref 3.5–5.2)
ALBUMIN/GLOB SERPL: 2.1 G/DL
ALP SERPL-CCNC: 77 U/L (ref 39–117)
ALT SERPL W P-5'-P-CCNC: <5 U/L (ref 1–33)
ANION GAP SERPL CALCULATED.3IONS-SCNC: 11 MMOL/L (ref 5–15)
AST SERPL-CCNC: 15 U/L (ref 1–32)
BASOPHILS # BLD AUTO: 0.08 10*3/MM3 (ref 0–0.2)
BASOPHILS NFR BLD AUTO: 1.3 % (ref 0–1.5)
BILIRUB SERPL-MCNC: 0.2 MG/DL (ref 0–1.2)
BUN SERPL-MCNC: 7 MG/DL (ref 6–20)
BUN/CREAT SERPL: 9.2 (ref 7–25)
CALCIUM SPEC-SCNC: 8.5 MG/DL (ref 8.6–10.5)
CHLORIDE SERPL-SCNC: 106 MMOL/L (ref 98–107)
CO2 SERPL-SCNC: 24 MMOL/L (ref 22–29)
CREAT SERPL-MCNC: 0.76 MG/DL (ref 0.57–1)
DEPRECATED RDW RBC AUTO: 47.1 FL (ref 37–54)
EGFRCR SERPLBLD CKD-EPI 2021: 101.7 ML/MIN/1.73
EOSINOPHIL # BLD AUTO: 0.53 10*3/MM3 (ref 0–0.4)
EOSINOPHIL NFR BLD AUTO: 8.8 % (ref 0.3–6.2)
ERYTHROCYTE [DISTWIDTH] IN BLOOD BY AUTOMATED COUNT: 15.8 % (ref 12.3–15.4)
FERRITIN SERPL-MCNC: 24.5 NG/ML (ref 13–150)
FOLATE SERPL-MCNC: 6.43 NG/ML (ref 4.78–24.2)
GLOBULIN UR ELPH-MCNC: 2 GM/DL
GLUCOSE SERPL-MCNC: 72 MG/DL (ref 65–99)
HCT VFR BLD AUTO: 34 % (ref 34–46.6)
HGB BLD-MCNC: 10.6 G/DL (ref 12–15.9)
IMM GRANULOCYTES # BLD AUTO: 0.02 10*3/MM3 (ref 0–0.05)
IMM GRANULOCYTES NFR BLD AUTO: 0.3 % (ref 0–0.5)
IRON 24H UR-MRATE: 34 MCG/DL (ref 37–145)
IRON SATN MFR SERPL: 6 % (ref 20–50)
LYMPHOCYTES # BLD AUTO: 1.85 10*3/MM3 (ref 0.7–3.1)
LYMPHOCYTES NFR BLD AUTO: 30.7 % (ref 19.6–45.3)
MCH RBC QN AUTO: 25.8 PG (ref 26.6–33)
MCHC RBC AUTO-ENTMCNC: 31.2 G/DL (ref 31.5–35.7)
MCV RBC AUTO: 82.7 FL (ref 79–97)
MONOCYTES # BLD AUTO: 0.41 10*3/MM3 (ref 0.1–0.9)
MONOCYTES NFR BLD AUTO: 6.8 % (ref 5–12)
NEUTROPHILS NFR BLD AUTO: 3.14 10*3/MM3 (ref 1.7–7)
NEUTROPHILS NFR BLD AUTO: 52.1 % (ref 42.7–76)
NRBC BLD AUTO-RTO: 0 /100 WBC (ref 0–0.2)
PLATELET # BLD AUTO: 523 10*3/MM3 (ref 140–450)
PMV BLD AUTO: 9.3 FL (ref 6–12)
POTASSIUM SERPL-SCNC: 3.6 MMOL/L (ref 3.5–5.2)
PROT SERPL-MCNC: 6.2 G/DL (ref 6–8.5)
RBC # BLD AUTO: 4.11 10*6/MM3 (ref 3.77–5.28)
SODIUM SERPL-SCNC: 141 MMOL/L (ref 136–145)
TIBC SERPL-MCNC: 569 MCG/DL (ref 298–536)
TRANSFERRIN SERPL-MCNC: 382 MG/DL (ref 200–360)
TSH SERPL DL<=0.05 MIU/L-ACNC: 1.62 UIU/ML (ref 0.27–4.2)
VIT B12 BLD-MCNC: 162 PG/ML (ref 211–946)
WBC NRBC COR # BLD: 6.03 10*3/MM3 (ref 3.4–10.8)

## 2022-09-22 PROCEDURE — 82607 VITAMIN B-12: CPT | Performed by: NURSE PRACTITIONER

## 2022-09-22 PROCEDURE — 80050 GENERAL HEALTH PANEL: CPT | Performed by: NURSE PRACTITIONER

## 2022-09-22 PROCEDURE — 36415 COLL VENOUS BLD VENIPUNCTURE: CPT | Performed by: NURSE PRACTITIONER

## 2022-09-22 PROCEDURE — 84466 ASSAY OF TRANSFERRIN: CPT | Performed by: NURSE PRACTITIONER

## 2022-09-22 PROCEDURE — 82728 ASSAY OF FERRITIN: CPT | Performed by: NURSE PRACTITIONER

## 2022-09-22 PROCEDURE — 83540 ASSAY OF IRON: CPT | Performed by: NURSE PRACTITIONER

## 2022-09-22 PROCEDURE — 82746 ASSAY OF FOLIC ACID SERUM: CPT | Performed by: NURSE PRACTITIONER

## 2022-09-22 RX ORDER — TOPIRAMATE 100 MG/1
TABLET, FILM COATED ORAL
Qty: 270 TABLET | Refills: 0 | Status: SHIPPED | OUTPATIENT
Start: 2022-09-22 | End: 2022-12-22

## 2022-09-23 ENCOUNTER — TELEPHONE (OUTPATIENT)
Dept: INTERNAL MEDICINE | Facility: CLINIC | Age: 42
End: 2022-09-23

## 2022-09-23 DIAGNOSIS — E61.1 IRON DEFICIENCY: ICD-10-CM

## 2022-09-23 DIAGNOSIS — E61.1 IRON DEFICIENCY: Primary | ICD-10-CM

## 2022-09-23 DIAGNOSIS — E53.8 B12 DEFICIENCY: Primary | ICD-10-CM

## 2022-09-23 RX ORDER — CYANOCOBALAMIN 1000 UG/ML
1000 INJECTION, SOLUTION INTRAMUSCULAR; SUBCUTANEOUS
Status: SHIPPED | OUTPATIENT
Start: 2022-09-23 | End: 2022-10-21

## 2022-09-23 RX ORDER — CYANOCOBALAMIN 1000 UG/ML
1000 INJECTION, SOLUTION INTRAMUSCULAR; SUBCUTANEOUS
Status: SHIPPED | OUTPATIENT
Start: 2022-09-23 | End: 2022-12-16

## 2022-09-23 RX ORDER — FERROUS SULFATE 325(65) MG
325 TABLET ORAL 2 TIMES DAILY WITH MEALS
Qty: 60 TABLET | Refills: 3 | Status: SHIPPED | OUTPATIENT
Start: 2022-09-23 | End: 2022-12-13

## 2022-09-23 NOTE — TELEPHONE ENCOUNTER
Caller: Kalen Josiane DARIANA    Relationship: Self    Best call back number: [phone]    Who are you requesting to speak with (clinical staff, provider,  specific staff member): MEDICAL STAFF    What was the call regarding: PATIENT RECEIVED A MESSAGE WITH RESULTS. SHE WAS TOLD THAT AN IRON MEDICATION WAS BEING SENT TO THE PHARMACY. SHE IS UPSET BECAUSE SHE HAS EXPLAINED THAT SHE HAS HAD GASTRIC BYPASS SURGERY AND SHE CANNOT TAKE IRON MEDICATION. SHE WILL HAVE TO BE SCHEDULE FOR AN IRON INFUSION. ATTEMPTED TO WARM TRANSFER. SHE WILL BE GETTING A PROCEDURE DONE Monday AND WILL NEED THE IRON INFUSION AS SOON AS POSSIBLE.

## 2022-09-26 DIAGNOSIS — E61.1 IRON DEFICIENCY: Primary | ICD-10-CM

## 2022-09-26 RX ORDER — SODIUM CHLORIDE 9 MG/ML
250 INJECTION, SOLUTION INTRAVENOUS ONCE
Status: CANCELLED | OUTPATIENT
Start: 2022-09-27

## 2022-09-26 RX ORDER — CETIRIZINE HYDROCHLORIDE 10 MG/1
10 TABLET ORAL ONCE
Status: CANCELLED | OUTPATIENT
Start: 2022-09-27 | End: 2022-09-27

## 2022-09-26 RX ORDER — FAMOTIDINE 10 MG/ML
20 INJECTION, SOLUTION INTRAVENOUS AS NEEDED
Status: CANCELLED | OUTPATIENT
Start: 2022-09-27

## 2022-09-26 RX ORDER — ACETAMINOPHEN 325 MG/1
650 TABLET ORAL ONCE
Status: CANCELLED | OUTPATIENT
Start: 2022-09-27 | End: 2022-09-27

## 2022-09-26 RX ORDER — DIPHENHYDRAMINE HYDROCHLORIDE 50 MG/ML
50 INJECTION INTRAMUSCULAR; INTRAVENOUS AS NEEDED
Status: CANCELLED | OUTPATIENT
Start: 2022-09-27

## 2022-09-26 RX ORDER — FAMOTIDINE 10 MG/ML
20 INJECTION, SOLUTION INTRAVENOUS ONCE
Status: CANCELLED | OUTPATIENT
Start: 2022-09-27 | End: 2022-09-27

## 2022-09-26 RX ORDER — DIPHENHYDRAMINE HCL 25 MG
25 CAPSULE ORAL ONCE
Status: CANCELLED | OUTPATIENT
Start: 2022-09-27 | End: 2022-09-27

## 2022-09-26 NOTE — TELEPHONE ENCOUNTER
Left message to call the office back.      Results: Judy Cooney:  Iron deficiency anemia noted. Increase iron in diet, leafy greens and red meat. Start ferrous sulfate, take with Vitamin C to help absorption. Repeat labs in 3 months to make sure levels have improved.

## 2022-09-26 NOTE — TELEPHONE ENCOUNTER
Hub staff attempted to follow warm transfer process and was unsuccessful     Caller: Josiane Higuera    Relationship to patient: Self    Best call back number: [phone]    Patient is needing: PATIENT STATED: RETURNING CALL AND REQUESTING A CALL BACK

## 2022-09-28 NOTE — TELEPHONE ENCOUNTER
Patient can not take iron due to having a gastric bypass surgery. Please place order for iron infusion.

## 2022-10-03 ENCOUNTER — TELEPHONE (OUTPATIENT)
Dept: INTERNAL MEDICINE | Facility: CLINIC | Age: 42
End: 2022-10-03

## 2022-10-03 NOTE — TELEPHONE ENCOUNTER
Caller: Josiane Higuera    Relationship: Self    Best call back number: [phone]    What is the best time to reach you: ANY     Who are you requesting to speak with CLINICAL     What was the call regarding: PATIENT WOULD LIKE A CALL BACK REGARDING IRON INFUSION APPOINTMENT     Do you require a callback: YES

## 2022-10-03 NOTE — TELEPHONE ENCOUNTER
Spoke with patient. She has not been called to set up appt yet for infusion. Please call and schedule appt. She prefers mornings. Call pt with appt date and time.

## 2022-10-13 ENCOUNTER — CLINICAL SUPPORT (OUTPATIENT)
Dept: INTERNAL MEDICINE | Facility: CLINIC | Age: 42
End: 2022-10-13

## 2022-10-13 DIAGNOSIS — E61.1 IRON DEFICIENCY: Primary | ICD-10-CM

## 2022-10-13 DIAGNOSIS — D50.9 IRON DEFICIENCY ANEMIA, UNSPECIFIED IRON DEFICIENCY ANEMIA TYPE: ICD-10-CM

## 2022-10-13 DIAGNOSIS — Z23 NEED FOR INFLUENZA VACCINATION: Primary | ICD-10-CM

## 2022-10-13 DIAGNOSIS — E53.8 VITAMIN B 12 DEFICIENCY: ICD-10-CM

## 2022-10-13 PROCEDURE — 96372 THER/PROPH/DIAG INJ SC/IM: CPT | Performed by: NURSE PRACTITIONER

## 2022-10-13 PROCEDURE — 90686 IIV4 VACC NO PRSV 0.5 ML IM: CPT | Performed by: NURSE PRACTITIONER

## 2022-10-13 PROCEDURE — 90471 IMMUNIZATION ADMIN: CPT | Performed by: NURSE PRACTITIONER

## 2022-10-13 RX ORDER — DIPHENHYDRAMINE HYDROCHLORIDE 50 MG/ML
25 INJECTION INTRAMUSCULAR; INTRAVENOUS ONCE
Status: CANCELLED | OUTPATIENT
Start: 2022-10-14

## 2022-10-13 RX ORDER — FAMOTIDINE 10 MG/ML
20 INJECTION, SOLUTION INTRAVENOUS AS NEEDED
Status: CANCELLED | OUTPATIENT
Start: 2022-10-14

## 2022-10-13 RX ORDER — ACETAMINOPHEN 325 MG/1
650 TABLET ORAL ONCE
Status: CANCELLED | OUTPATIENT
Start: 2022-10-14

## 2022-10-13 RX ORDER — DIPHENHYDRAMINE HCL 25 MG
25 CAPSULE ORAL ONCE
Status: CANCELLED | OUTPATIENT
Start: 2022-10-14

## 2022-10-13 RX ORDER — DIPHENHYDRAMINE HYDROCHLORIDE 50 MG/ML
50 INJECTION INTRAMUSCULAR; INTRAVENOUS AS NEEDED
Status: CANCELLED | OUTPATIENT
Start: 2022-10-14

## 2022-10-13 RX ORDER — FAMOTIDINE 10 MG/ML
20 INJECTION, SOLUTION INTRAVENOUS ONCE
Status: CANCELLED | OUTPATIENT
Start: 2022-10-14

## 2022-10-13 RX ORDER — FAMOTIDINE 20 MG/1
20 TABLET, FILM COATED ORAL ONCE
Status: CANCELLED | OUTPATIENT
Start: 2022-10-14

## 2022-10-13 RX ORDER — SODIUM CHLORIDE 9 MG/ML
250 INJECTION, SOLUTION INTRAVENOUS ONCE
Status: CANCELLED | OUTPATIENT
Start: 2022-10-14

## 2022-10-13 RX ADMIN — CYANOCOBALAMIN 1000 MCG: 1000 INJECTION, SOLUTION INTRAMUSCULAR; SUBCUTANEOUS at 10:59

## 2022-10-14 ENCOUNTER — HOSPITAL ENCOUNTER (OUTPATIENT)
Dept: INFUSION THERAPY | Facility: HOSPITAL | Age: 42
End: 2022-10-14

## 2022-10-17 ENCOUNTER — HOSPITAL ENCOUNTER (OUTPATIENT)
Dept: INFUSION THERAPY | Facility: HOSPITAL | Age: 42
Discharge: HOME | End: 2022-10-17
Admitting: NURSE PRACTITIONER

## 2022-10-17 VITALS
BODY MASS INDEX: 28.89 KG/M2 | TEMPERATURE: 97.6 F | DIASTOLIC BLOOD PRESSURE: 68 MMHG | HEART RATE: 68 BPM | RESPIRATION RATE: 18 BRPM | SYSTOLIC BLOOD PRESSURE: 115 MMHG | OXYGEN SATURATION: 99 % | HEIGHT: 61 IN | WEIGHT: 153 LBS

## 2022-10-17 DIAGNOSIS — E61.1 IRON DEFICIENCY: Primary | ICD-10-CM

## 2022-10-17 DIAGNOSIS — D50.9 IRON DEFICIENCY ANEMIA, UNSPECIFIED IRON DEFICIENCY ANEMIA TYPE: ICD-10-CM

## 2022-10-17 PROCEDURE — 96374 THER/PROPH/DIAG INJ IV PUSH: CPT

## 2022-10-17 PROCEDURE — 25010000002 IRON SUCROSE PER 1 MG: Performed by: NURSE PRACTITIONER

## 2022-10-17 RX ORDER — DIPHENHYDRAMINE HYDROCHLORIDE 50 MG/ML
25 INJECTION INTRAMUSCULAR; INTRAVENOUS ONCE
Status: CANCELLED | OUTPATIENT
Start: 2022-10-18

## 2022-10-17 RX ORDER — ACETAMINOPHEN 325 MG/1
650 TABLET ORAL ONCE
Status: DISCONTINUED | OUTPATIENT
Start: 2022-10-17 | End: 2022-10-19 | Stop reason: HOSPADM

## 2022-10-17 RX ORDER — DIPHENHYDRAMINE HCL 25 MG
25 CAPSULE ORAL ONCE
Status: CANCELLED | OUTPATIENT
Start: 2022-10-18

## 2022-10-17 RX ORDER — DIPHENHYDRAMINE HCL 25 MG
25 CAPSULE ORAL ONCE
Status: DISCONTINUED | OUTPATIENT
Start: 2022-10-17 | End: 2022-10-19 | Stop reason: HOSPADM

## 2022-10-17 RX ORDER — ACETAMINOPHEN 325 MG/1
650 TABLET ORAL ONCE
Status: CANCELLED | OUTPATIENT
Start: 2022-10-18

## 2022-10-17 RX ORDER — FAMOTIDINE 20 MG/1
20 TABLET, FILM COATED ORAL ONCE
Status: CANCELLED | OUTPATIENT
Start: 2022-10-18

## 2022-10-17 RX ORDER — FAMOTIDINE 10 MG/ML
20 INJECTION, SOLUTION INTRAVENOUS AS NEEDED
Status: CANCELLED | OUTPATIENT
Start: 2022-10-18

## 2022-10-17 RX ORDER — SODIUM CHLORIDE 9 MG/ML
250 INJECTION, SOLUTION INTRAVENOUS ONCE
Status: CANCELLED | OUTPATIENT
Start: 2022-10-18

## 2022-10-17 RX ORDER — FAMOTIDINE 10 MG/ML
20 INJECTION, SOLUTION INTRAVENOUS ONCE
Status: CANCELLED | OUTPATIENT
Start: 2022-10-18

## 2022-10-17 RX ORDER — SODIUM CHLORIDE 9 MG/ML
250 INJECTION, SOLUTION INTRAVENOUS ONCE
Status: DISCONTINUED | OUTPATIENT
Start: 2022-10-17 | End: 2022-10-19 | Stop reason: HOSPADM

## 2022-10-17 RX ORDER — FAMOTIDINE 20 MG/1
20 TABLET, FILM COATED ORAL ONCE
Status: DISCONTINUED | OUTPATIENT
Start: 2022-10-17 | End: 2022-10-19 | Stop reason: HOSPADM

## 2022-10-17 RX ORDER — DIPHENHYDRAMINE HYDROCHLORIDE 50 MG/ML
50 INJECTION INTRAMUSCULAR; INTRAVENOUS AS NEEDED
Status: CANCELLED | OUTPATIENT
Start: 2022-10-18

## 2022-10-17 RX ADMIN — IRON SUCROSE 200 MG: 20 INJECTION, SOLUTION INTRAVENOUS at 07:55

## 2022-10-18 ENCOUNTER — HOSPITAL ENCOUNTER (OUTPATIENT)
Dept: INFUSION THERAPY | Facility: HOSPITAL | Age: 42
Setting detail: INFUSION SERIES
Discharge: HOME | End: 2022-10-18

## 2022-10-18 VITALS
TEMPERATURE: 98.4 F | SYSTOLIC BLOOD PRESSURE: 112 MMHG | HEART RATE: 52 BPM | DIASTOLIC BLOOD PRESSURE: 78 MMHG | RESPIRATION RATE: 16 BRPM | OXYGEN SATURATION: 100 %

## 2022-10-18 DIAGNOSIS — E61.1 IRON DEFICIENCY: Primary | ICD-10-CM

## 2022-10-18 DIAGNOSIS — D50.9 IRON DEFICIENCY ANEMIA, UNSPECIFIED IRON DEFICIENCY ANEMIA TYPE: ICD-10-CM

## 2022-10-18 PROCEDURE — 96374 THER/PROPH/DIAG INJ IV PUSH: CPT

## 2022-10-18 PROCEDURE — 25010000002 IRON SUCROSE PER 1 MG: Performed by: NURSE PRACTITIONER

## 2022-10-18 RX ORDER — FAMOTIDINE 20 MG/1
20 TABLET, FILM COATED ORAL ONCE
Status: DISCONTINUED | OUTPATIENT
Start: 2022-10-18 | End: 2022-10-20 | Stop reason: HOSPADM

## 2022-10-18 RX ORDER — FAMOTIDINE 10 MG/ML
20 INJECTION, SOLUTION INTRAVENOUS AS NEEDED
Status: CANCELLED | OUTPATIENT
Start: 2022-10-19

## 2022-10-18 RX ORDER — DIPHENHYDRAMINE HCL 25 MG
25 CAPSULE ORAL ONCE
Status: CANCELLED | OUTPATIENT
Start: 2022-10-19

## 2022-10-18 RX ORDER — DIPHENHYDRAMINE HCL 25 MG
25 CAPSULE ORAL ONCE
Status: DISCONTINUED | OUTPATIENT
Start: 2022-10-18 | End: 2022-10-20 | Stop reason: HOSPADM

## 2022-10-18 RX ORDER — ACETAMINOPHEN 325 MG/1
650 TABLET ORAL ONCE
Status: DISCONTINUED | OUTPATIENT
Start: 2022-10-18 | End: 2022-10-20 | Stop reason: HOSPADM

## 2022-10-18 RX ORDER — SODIUM CHLORIDE 9 MG/ML
250 INJECTION, SOLUTION INTRAVENOUS ONCE
Status: CANCELLED | OUTPATIENT
Start: 2022-10-19

## 2022-10-18 RX ORDER — FAMOTIDINE 10 MG/ML
20 INJECTION, SOLUTION INTRAVENOUS ONCE
Status: CANCELLED | OUTPATIENT
Start: 2022-10-19

## 2022-10-18 RX ORDER — DIPHENHYDRAMINE HYDROCHLORIDE 50 MG/ML
25 INJECTION INTRAMUSCULAR; INTRAVENOUS ONCE
Status: CANCELLED | OUTPATIENT
Start: 2022-10-19

## 2022-10-18 RX ORDER — SODIUM CHLORIDE 9 MG/ML
250 INJECTION, SOLUTION INTRAVENOUS ONCE
Status: DISCONTINUED | OUTPATIENT
Start: 2022-10-18 | End: 2022-10-20 | Stop reason: HOSPADM

## 2022-10-18 RX ORDER — FAMOTIDINE 20 MG/1
20 TABLET, FILM COATED ORAL ONCE
Status: CANCELLED | OUTPATIENT
Start: 2022-10-19

## 2022-10-18 RX ORDER — ACETAMINOPHEN 325 MG/1
650 TABLET ORAL ONCE
Status: CANCELLED | OUTPATIENT
Start: 2022-10-19

## 2022-10-18 RX ORDER — DIPHENHYDRAMINE HYDROCHLORIDE 50 MG/ML
50 INJECTION INTRAMUSCULAR; INTRAVENOUS AS NEEDED
Status: CANCELLED | OUTPATIENT
Start: 2022-10-19

## 2022-10-18 RX ADMIN — IRON SUCROSE 200 MG: 20 INJECTION, SOLUTION INTRAVENOUS at 08:07

## 2022-10-19 ENCOUNTER — HOSPITAL ENCOUNTER (OUTPATIENT)
Dept: INFUSION THERAPY | Facility: HOSPITAL | Age: 42
Setting detail: INFUSION SERIES
Discharge: HOME | End: 2022-10-19

## 2022-10-19 VITALS
HEART RATE: 62 BPM | SYSTOLIC BLOOD PRESSURE: 108 MMHG | OXYGEN SATURATION: 99 % | TEMPERATURE: 98.5 F | RESPIRATION RATE: 18 BRPM | DIASTOLIC BLOOD PRESSURE: 71 MMHG

## 2022-10-19 DIAGNOSIS — E61.1 IRON DEFICIENCY: Primary | ICD-10-CM

## 2022-10-19 DIAGNOSIS — D50.9 IRON DEFICIENCY ANEMIA, UNSPECIFIED IRON DEFICIENCY ANEMIA TYPE: ICD-10-CM

## 2022-10-19 PROCEDURE — 25010000002 IRON SUCROSE PER 1 MG: Performed by: NURSE PRACTITIONER

## 2022-10-19 PROCEDURE — 96374 THER/PROPH/DIAG INJ IV PUSH: CPT

## 2022-10-19 RX ORDER — ACETAMINOPHEN 325 MG/1
650 TABLET ORAL ONCE
Status: CANCELLED | OUTPATIENT
Start: 2022-10-20

## 2022-10-19 RX ORDER — SODIUM CHLORIDE 9 MG/ML
250 INJECTION, SOLUTION INTRAVENOUS ONCE
Status: DISCONTINUED | OUTPATIENT
Start: 2022-10-19 | End: 2022-10-21 | Stop reason: HOSPADM

## 2022-10-19 RX ORDER — FAMOTIDINE 10 MG/ML
20 INJECTION, SOLUTION INTRAVENOUS AS NEEDED
Status: CANCELLED | OUTPATIENT
Start: 2022-10-20

## 2022-10-19 RX ORDER — DIPHENHYDRAMINE HCL 25 MG
25 CAPSULE ORAL ONCE
Status: DISCONTINUED | OUTPATIENT
Start: 2022-10-19 | End: 2022-10-21 | Stop reason: HOSPADM

## 2022-10-19 RX ORDER — FAMOTIDINE 20 MG/1
20 TABLET, FILM COATED ORAL ONCE
Status: CANCELLED | OUTPATIENT
Start: 2022-10-20

## 2022-10-19 RX ORDER — DIPHENHYDRAMINE HCL 25 MG
25 CAPSULE ORAL ONCE
Status: CANCELLED | OUTPATIENT
Start: 2022-10-20

## 2022-10-19 RX ORDER — FAMOTIDINE 20 MG/1
20 TABLET, FILM COATED ORAL ONCE
Status: DISCONTINUED | OUTPATIENT
Start: 2022-10-19 | End: 2022-10-21 | Stop reason: HOSPADM

## 2022-10-19 RX ORDER — SODIUM CHLORIDE 9 MG/ML
250 INJECTION, SOLUTION INTRAVENOUS ONCE
Status: CANCELLED | OUTPATIENT
Start: 2022-10-20

## 2022-10-19 RX ORDER — DIPHENHYDRAMINE HYDROCHLORIDE 50 MG/ML
25 INJECTION INTRAMUSCULAR; INTRAVENOUS ONCE
Status: CANCELLED | OUTPATIENT
Start: 2022-10-20

## 2022-10-19 RX ORDER — FAMOTIDINE 10 MG/ML
20 INJECTION, SOLUTION INTRAVENOUS ONCE
Status: CANCELLED | OUTPATIENT
Start: 2022-10-20

## 2022-10-19 RX ORDER — DIPHENHYDRAMINE HYDROCHLORIDE 50 MG/ML
50 INJECTION INTRAMUSCULAR; INTRAVENOUS AS NEEDED
Status: CANCELLED | OUTPATIENT
Start: 2022-10-20

## 2022-10-19 RX ORDER — ACETAMINOPHEN 325 MG/1
650 TABLET ORAL ONCE
Status: DISCONTINUED | OUTPATIENT
Start: 2022-10-19 | End: 2022-10-21 | Stop reason: HOSPADM

## 2022-10-19 RX ADMIN — IRON SUCROSE 200 MG: 20 INJECTION, SOLUTION INTRAVENOUS at 08:07

## 2022-10-20 ENCOUNTER — HOSPITAL ENCOUNTER (OUTPATIENT)
Dept: INFUSION THERAPY | Facility: HOSPITAL | Age: 42
Setting detail: INFUSION SERIES
Discharge: HOME | End: 2022-10-20

## 2022-10-20 VITALS
HEIGHT: 61 IN | OXYGEN SATURATION: 100 % | SYSTOLIC BLOOD PRESSURE: 115 MMHG | TEMPERATURE: 98.2 F | WEIGHT: 153.66 LBS | HEART RATE: 56 BPM | DIASTOLIC BLOOD PRESSURE: 82 MMHG | RESPIRATION RATE: 16 BRPM | BODY MASS INDEX: 29.01 KG/M2

## 2022-10-20 DIAGNOSIS — D50.9 IRON DEFICIENCY ANEMIA, UNSPECIFIED IRON DEFICIENCY ANEMIA TYPE: ICD-10-CM

## 2022-10-20 DIAGNOSIS — E61.1 IRON DEFICIENCY: Primary | ICD-10-CM

## 2022-10-20 PROCEDURE — 96374 THER/PROPH/DIAG INJ IV PUSH: CPT

## 2022-10-20 PROCEDURE — 25010000002 IRON SUCROSE PER 1 MG: Performed by: NURSE PRACTITIONER

## 2022-10-20 RX ORDER — FAMOTIDINE 20 MG/1
20 TABLET, FILM COATED ORAL ONCE
Status: CANCELLED | OUTPATIENT
Start: 2022-10-21

## 2022-10-20 RX ORDER — DIPHENHYDRAMINE HYDROCHLORIDE 50 MG/ML
25 INJECTION INTRAMUSCULAR; INTRAVENOUS ONCE
Status: CANCELLED | OUTPATIENT
Start: 2022-10-21

## 2022-10-20 RX ORDER — ACETAMINOPHEN 325 MG/1
650 TABLET ORAL ONCE
Status: CANCELLED | OUTPATIENT
Start: 2022-10-21

## 2022-10-20 RX ORDER — FAMOTIDINE 10 MG/ML
20 INJECTION, SOLUTION INTRAVENOUS AS NEEDED
Status: CANCELLED | OUTPATIENT
Start: 2022-10-21

## 2022-10-20 RX ORDER — SODIUM CHLORIDE 9 MG/ML
250 INJECTION, SOLUTION INTRAVENOUS ONCE
Status: CANCELLED | OUTPATIENT
Start: 2022-10-21

## 2022-10-20 RX ORDER — FAMOTIDINE 10 MG/ML
20 INJECTION, SOLUTION INTRAVENOUS ONCE
Status: DISCONTINUED | OUTPATIENT
Start: 2022-10-20 | End: 2022-10-20 | Stop reason: SDUPTHER

## 2022-10-20 RX ORDER — ACETAMINOPHEN 325 MG/1
650 TABLET ORAL ONCE
Status: DISCONTINUED | OUTPATIENT
Start: 2022-10-20 | End: 2022-10-22 | Stop reason: HOSPADM

## 2022-10-20 RX ORDER — DIPHENHYDRAMINE HCL 25 MG
25 CAPSULE ORAL ONCE
Status: CANCELLED | OUTPATIENT
Start: 2022-10-21

## 2022-10-20 RX ORDER — SODIUM CHLORIDE 9 MG/ML
250 INJECTION, SOLUTION INTRAVENOUS ONCE
Status: DISCONTINUED | OUTPATIENT
Start: 2022-10-20 | End: 2022-10-22 | Stop reason: HOSPADM

## 2022-10-20 RX ORDER — FAMOTIDINE 20 MG/1
20 TABLET, FILM COATED ORAL ONCE
Status: DISCONTINUED | OUTPATIENT
Start: 2022-10-20 | End: 2022-10-22 | Stop reason: HOSPADM

## 2022-10-20 RX ORDER — DIPHENHYDRAMINE HYDROCHLORIDE 50 MG/ML
50 INJECTION INTRAMUSCULAR; INTRAVENOUS AS NEEDED
Status: CANCELLED | OUTPATIENT
Start: 2022-10-21

## 2022-10-20 RX ORDER — FAMOTIDINE 10 MG/ML
20 INJECTION, SOLUTION INTRAVENOUS ONCE
Status: CANCELLED | OUTPATIENT
Start: 2022-10-21

## 2022-10-20 RX ORDER — DIPHENHYDRAMINE HCL 25 MG
25 CAPSULE ORAL ONCE
Status: DISCONTINUED | OUTPATIENT
Start: 2022-10-20 | End: 2022-10-22 | Stop reason: HOSPADM

## 2022-10-20 RX ORDER — DIPHENHYDRAMINE HYDROCHLORIDE 50 MG/ML
25 INJECTION INTRAMUSCULAR; INTRAVENOUS ONCE
Status: DISCONTINUED | OUTPATIENT
Start: 2022-10-20 | End: 2022-10-20 | Stop reason: SDUPTHER

## 2022-10-20 RX ADMIN — IRON SUCROSE 200 MG: 20 INJECTION, SOLUTION INTRAVENOUS at 08:42

## 2022-10-21 ENCOUNTER — HOSPITAL ENCOUNTER (OUTPATIENT)
Dept: INFUSION THERAPY | Facility: HOSPITAL | Age: 42
Setting detail: INFUSION SERIES
Discharge: HOME | End: 2022-10-21

## 2022-10-21 ENCOUNTER — CLINICAL SUPPORT (OUTPATIENT)
Dept: INTERNAL MEDICINE | Facility: CLINIC | Age: 42
End: 2022-10-21

## 2022-10-21 VITALS
TEMPERATURE: 97.8 F | DIASTOLIC BLOOD PRESSURE: 68 MMHG | RESPIRATION RATE: 16 BRPM | OXYGEN SATURATION: 100 % | SYSTOLIC BLOOD PRESSURE: 107 MMHG | HEART RATE: 57 BPM

## 2022-10-21 DIAGNOSIS — E53.8 B12 DEFICIENCY: Primary | ICD-10-CM

## 2022-10-21 DIAGNOSIS — E61.1 IRON DEFICIENCY: Primary | ICD-10-CM

## 2022-10-21 DIAGNOSIS — D50.9 IRON DEFICIENCY ANEMIA, UNSPECIFIED IRON DEFICIENCY ANEMIA TYPE: ICD-10-CM

## 2022-10-21 LAB
BASOPHILS # BLD AUTO: 0.07 10*3/MM3 (ref 0–0.2)
BASOPHILS NFR BLD AUTO: 1.4 % (ref 0–1.5)
DEPRECATED RDW RBC AUTO: 54 FL (ref 37–54)
EOSINOPHIL # BLD AUTO: 0.5 10*3/MM3 (ref 0–0.4)
EOSINOPHIL NFR BLD AUTO: 10.3 % (ref 0.3–6.2)
ERYTHROCYTE [DISTWIDTH] IN BLOOD BY AUTOMATED COUNT: 17.6 % (ref 12.3–15.4)
FERRITIN SERPL-MCNC: 376.1 NG/ML (ref 13–150)
HCT VFR BLD AUTO: 32.7 % (ref 34–46.6)
HGB BLD-MCNC: 10.6 G/DL (ref 12–15.9)
IMM GRANULOCYTES # BLD AUTO: 0.01 10*3/MM3 (ref 0–0.05)
IMM GRANULOCYTES NFR BLD AUTO: 0.2 % (ref 0–0.5)
IRON 24H UR-MRATE: 230 MCG/DL (ref 37–145)
IRON SATN MFR SERPL: 50 % (ref 20–50)
LYMPHOCYTES # BLD AUTO: 1.76 10*3/MM3 (ref 0.7–3.1)
LYMPHOCYTES NFR BLD AUTO: 36.3 % (ref 19.6–45.3)
MCH RBC QN AUTO: 27.5 PG (ref 26.6–33)
MCHC RBC AUTO-ENTMCNC: 32.4 G/DL (ref 31.5–35.7)
MCV RBC AUTO: 84.7 FL (ref 79–97)
MONOCYTES # BLD AUTO: 0.4 10*3/MM3 (ref 0.1–0.9)
MONOCYTES NFR BLD AUTO: 8.2 % (ref 5–12)
NEUTROPHILS NFR BLD AUTO: 2.11 10*3/MM3 (ref 1.7–7)
NEUTROPHILS NFR BLD AUTO: 43.6 % (ref 42.7–76)
NRBC BLD AUTO-RTO: 0 /100 WBC (ref 0–0.2)
PLATELET # BLD AUTO: 622 10*3/MM3 (ref 140–450)
PMV BLD AUTO: 8.6 FL (ref 6–12)
RBC # BLD AUTO: 3.86 10*6/MM3 (ref 3.77–5.28)
RBC MORPH BLD: NORMAL
SMALL PLATELETS BLD QL SMEAR: NORMAL
TIBC SERPL-MCNC: 463 MCG/DL (ref 298–536)
TRANSFERRIN SERPL-MCNC: 311 MG/DL (ref 200–360)
WBC MORPH BLD: NORMAL
WBC NRBC COR # BLD: 4.85 10*3/MM3 (ref 3.4–10.8)

## 2022-10-21 PROCEDURE — 96374 THER/PROPH/DIAG INJ IV PUSH: CPT

## 2022-10-21 PROCEDURE — 82728 ASSAY OF FERRITIN: CPT | Performed by: NURSE PRACTITIONER

## 2022-10-21 PROCEDURE — 85007 BL SMEAR W/DIFF WBC COUNT: CPT | Performed by: NURSE PRACTITIONER

## 2022-10-21 PROCEDURE — 83540 ASSAY OF IRON: CPT | Performed by: NURSE PRACTITIONER

## 2022-10-21 PROCEDURE — 25010000002 IRON SUCROSE PER 1 MG: Performed by: NURSE PRACTITIONER

## 2022-10-21 PROCEDURE — 96372 THER/PROPH/DIAG INJ SC/IM: CPT | Performed by: NURSE PRACTITIONER

## 2022-10-21 PROCEDURE — 84466 ASSAY OF TRANSFERRIN: CPT | Performed by: NURSE PRACTITIONER

## 2022-10-21 PROCEDURE — 85025 COMPLETE CBC W/AUTO DIFF WBC: CPT | Performed by: NURSE PRACTITIONER

## 2022-10-21 RX ORDER — DIPHENHYDRAMINE HYDROCHLORIDE 50 MG/ML
25 INJECTION INTRAMUSCULAR; INTRAVENOUS ONCE
Status: DISCONTINUED | OUTPATIENT
Start: 2022-10-21 | End: 2022-10-23 | Stop reason: HOSPADM

## 2022-10-21 RX ORDER — FAMOTIDINE 10 MG/ML
20 INJECTION, SOLUTION INTRAVENOUS AS NEEDED
OUTPATIENT
Start: 2022-10-22

## 2022-10-21 RX ORDER — ACETAMINOPHEN 325 MG/1
650 TABLET ORAL ONCE
Status: CANCELLED | OUTPATIENT
Start: 2022-10-22

## 2022-10-21 RX ORDER — DIPHENHYDRAMINE HCL 25 MG
25 CAPSULE ORAL ONCE
Status: CANCELLED | OUTPATIENT
Start: 2022-10-22

## 2022-10-21 RX ORDER — FAMOTIDINE 20 MG/1
20 TABLET, FILM COATED ORAL ONCE
Status: CANCELLED | OUTPATIENT
Start: 2022-10-22

## 2022-10-21 RX ORDER — DIPHENHYDRAMINE HYDROCHLORIDE 50 MG/ML
25 INJECTION INTRAMUSCULAR; INTRAVENOUS ONCE
OUTPATIENT
Start: 2022-10-22

## 2022-10-21 RX ORDER — DIPHENHYDRAMINE HCL 25 MG
25 CAPSULE ORAL ONCE
Status: DISCONTINUED | OUTPATIENT
Start: 2022-10-21 | End: 2022-10-23 | Stop reason: HOSPADM

## 2022-10-21 RX ORDER — ACETAMINOPHEN 325 MG/1
650 TABLET ORAL ONCE
Status: DISCONTINUED | OUTPATIENT
Start: 2022-10-21 | End: 2022-10-23 | Stop reason: HOSPADM

## 2022-10-21 RX ORDER — FAMOTIDINE 20 MG/1
20 TABLET, FILM COATED ORAL ONCE
Status: DISCONTINUED | OUTPATIENT
Start: 2022-10-21 | End: 2022-10-23 | Stop reason: HOSPADM

## 2022-10-21 RX ORDER — DIPHENHYDRAMINE HYDROCHLORIDE 50 MG/ML
50 INJECTION INTRAMUSCULAR; INTRAVENOUS AS NEEDED
OUTPATIENT
Start: 2022-10-22

## 2022-10-21 RX ORDER — FAMOTIDINE 10 MG/ML
20 INJECTION, SOLUTION INTRAVENOUS ONCE
OUTPATIENT
Start: 2022-10-22

## 2022-10-21 RX ORDER — SODIUM CHLORIDE 9 MG/ML
250 INJECTION, SOLUTION INTRAVENOUS ONCE
Status: CANCELLED | OUTPATIENT
Start: 2022-10-22

## 2022-10-21 RX ORDER — SODIUM CHLORIDE 9 MG/ML
250 INJECTION, SOLUTION INTRAVENOUS ONCE
Status: DISCONTINUED | OUTPATIENT
Start: 2022-10-21 | End: 2022-10-23 | Stop reason: HOSPADM

## 2022-10-21 RX ORDER — FAMOTIDINE 10 MG/ML
20 INJECTION, SOLUTION INTRAVENOUS ONCE
Status: DISCONTINUED | OUTPATIENT
Start: 2022-10-21 | End: 2022-10-23 | Stop reason: HOSPADM

## 2022-10-21 RX ADMIN — IRON SUCROSE 200 MG: 20 INJECTION, SOLUTION INTRAVENOUS at 08:39

## 2022-10-21 RX ADMIN — CYANOCOBALAMIN 1000 MCG: 1000 INJECTION, SOLUTION INTRAMUSCULAR; SUBCUTANEOUS at 09:55

## 2022-10-28 ENCOUNTER — CLINICAL SUPPORT (OUTPATIENT)
Dept: INTERNAL MEDICINE | Facility: CLINIC | Age: 42
End: 2022-10-28

## 2022-10-28 DIAGNOSIS — E53.8 B12 DEFICIENCY: Primary | ICD-10-CM

## 2022-10-28 PROCEDURE — 96372 THER/PROPH/DIAG INJ SC/IM: CPT | Performed by: NURSE PRACTITIONER

## 2022-10-28 RX ADMIN — CYANOCOBALAMIN 1000 MCG: 1000 INJECTION, SOLUTION INTRAMUSCULAR; SUBCUTANEOUS at 15:31

## 2022-11-30 RX ORDER — LEVOCETIRIZINE DIHYDROCHLORIDE 5 MG/1
TABLET, FILM COATED ORAL
Qty: 180 TABLET | Refills: 0 | Status: SHIPPED | OUTPATIENT
Start: 2022-11-30

## 2022-12-13 ENCOUNTER — TELEMEDICINE (OUTPATIENT)
Dept: BEHAVIORAL HEALTH | Facility: CLINIC | Age: 42
End: 2022-12-13

## 2022-12-13 DIAGNOSIS — F50.01 ANOREXIA NERVOSA, RESTRICTING TYPE: ICD-10-CM

## 2022-12-13 DIAGNOSIS — F43.21 GRIEF REACTION: Primary | ICD-10-CM

## 2022-12-13 DIAGNOSIS — F41.1 GENERALIZED ANXIETY DISORDER: ICD-10-CM

## 2022-12-13 DIAGNOSIS — F41.0 PANIC DISORDER: ICD-10-CM

## 2022-12-13 DIAGNOSIS — G47.00 INSOMNIA, UNSPECIFIED TYPE: ICD-10-CM

## 2022-12-13 PROBLEM — M19.079 OSTEOARTHRITIS OF ANKLE OR FOOT: Status: ACTIVE | Noted: 2022-08-16

## 2022-12-13 PROBLEM — Z96.649 HISTORY OF TOTAL HIP ARTHROPLASTY: Status: ACTIVE | Noted: 2022-10-13

## 2022-12-13 PROBLEM — M16.11 OSTEOARTHRITIS OF RIGHT HIP: Status: ACTIVE | Noted: 2022-08-16

## 2022-12-13 PROCEDURE — 90833 PSYTX W PT W E/M 30 MIN: CPT | Performed by: PHYSICIAN ASSISTANT

## 2022-12-13 PROCEDURE — 99214 OFFICE O/P EST MOD 30 MIN: CPT | Performed by: PHYSICIAN ASSISTANT

## 2022-12-13 RX ORDER — ONDANSETRON 8 MG/1
TABLET, ORALLY DISINTEGRATING ORAL
COMMUNITY
Start: 2022-11-30

## 2022-12-13 RX ORDER — TRAZODONE HYDROCHLORIDE 150 MG/1
150 TABLET ORAL NIGHTLY
Qty: 30 TABLET | Refills: 1 | Status: SHIPPED | OUTPATIENT
Start: 2022-12-13 | End: 2023-01-10 | Stop reason: SDUPTHER

## 2022-12-13 RX ORDER — CYCLOBENZAPRINE HCL 10 MG
TABLET ORAL
COMMUNITY
Start: 2022-11-30

## 2022-12-13 RX ORDER — PROPRANOLOL HYDROCHLORIDE 40 MG/1
40 TABLET ORAL 3 TIMES DAILY PRN
Qty: 90 TABLET | Refills: 2 | Status: SHIPPED | OUTPATIENT
Start: 2022-12-13 | End: 2023-01-10

## 2022-12-13 RX ORDER — METRONIDAZOLE 250 MG/1
TABLET ORAL
COMMUNITY
End: 2022-12-13

## 2022-12-13 RX ORDER — FLUOXETINE 10 MG/1
CAPSULE ORAL
Qty: 60 CAPSULE | Refills: 1 | Status: SHIPPED | OUTPATIENT
Start: 2022-12-13 | End: 2023-01-10 | Stop reason: SDUPTHER

## 2022-12-13 RX ORDER — LORAZEPAM 1 MG/1
TABLET ORAL
Qty: 30 TABLET | Refills: 0 | Status: SHIPPED | OUTPATIENT
Start: 2022-12-13

## 2022-12-13 RX ORDER — HYDROMORPHONE HYDROCHLORIDE 2 MG/1
2 TABLET ORAL EVERY 4 HOURS PRN
COMMUNITY
Start: 2022-10-03 | End: 2022-12-13

## 2022-12-13 RX ORDER — DOXYCYCLINE 100 MG/1
TABLET ORAL
COMMUNITY
Start: 2022-09-26 | End: 2022-12-13

## 2022-12-13 RX ORDER — PROPRANOLOL HYDROCHLORIDE 40 MG/1
TABLET ORAL
COMMUNITY
End: 2022-12-13

## 2022-12-13 RX ORDER — METHOCARBAMOL 500 MG/1
TABLET, FILM COATED ORAL EVERY 8 HOURS SCHEDULED
COMMUNITY

## 2022-12-13 NOTE — PROGRESS NOTES
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 0.40 10*3/mm3 Final   • Basophils, Absolute 02/01/2022 0.07  0.00 - 0.20 10*3/mm3 Final   • Immature Grans, Absolute 02/01/2022 0.01  0.00 - 0.05 10*3/mm3 Final   • nRBC 02/01/2022 0.2  0.0 - 0.2 /100 WBC Final   Office Visit on 12/28/2021   Component Date Value Ref Range Status   • Glucose 12/28/2021 78  65 - 99 mg/dL Final   • BUN 12/28/2021 11  6 - 20 mg/dL Final   • Creatinine 12/28/2021 0.85  0.57 - 1.00 mg/dL Final   • Sodium 12/28/2021 137  136 - 145 mmol/L Final   • Potassium 12/28/2021 3.9  3.5 - 5.2 mmol/L Final   • Chloride 12/28/2021 107  98 - 107 mmol/L Final   • CO2 12/28/2021 21.5 (L)  22.0 - 29.0 mmol/L Final   • Calcium 12/28/2021 9.3  8.6 - 10.5 mg/dL Final   • Total Protein 12/28/2021 6.8  6.0 - 8.5 g/dL Final   • Albumin 12/28/2021 4.10  3.50 - 5.20 g/dL Final   • ALT (SGPT) 12/28/2021 9  1 - 33 U/L Final   • AST (SGOT) 12/28/2021 15  1 - 32 U/L Final   • Alkaline Phosphatase 12/28/2021 73  39 - 117 U/L Final   • Total Bilirubin 12/28/2021 0.3  0.0 - 1.2 mg/dL Final   • eGFR Non  Amer 12/28/2021 74  >60 mL/min/1.73 Final   • Globulin 12/28/2021 2.7  gm/dL Final   • A/G Ratio 12/28/2021 1.5  g/dL Final   • BUN/Creatinine Ratio  12/28/2021 12.9  7.0 - 25.0 Final   • Anion Gap 12/28/2021 8.5  5.0 - 15.0 mmol/L Final   • TSH 12/28/2021 1.870  0.270 - 4.200 uIU/mL Final   • Free T4 12/28/2021 0.92 (L)  0.93 - 1.70 ng/dL Final   • Iron 12/28/2021 193 (H)  37 - 145 mcg/dL Final   • Iron Saturation 12/28/2021 52 (H)  20 - 50 % Final   • Transferrin 12/28/2021 247  200 - 360 mg/dL Final   • TIBC 12/28/2021 368  298 - 536 mcg/dL Final   • Ferritin 12/28/2021 99.10  13.00 - 150.00 ng/mL Final   • 25 Hydroxy, Vitamin D 12/28/2021 22.4  ng/ml Final   • Folate 12/28/2021 4.79  4.78 - 24.20 ng/mL Final   • Vitamin B-12 12/28/2021 244  211 - 946 pg/mL Final   • Magnesium 12/28/2021 2.3  1.6 - 2.6 mg/dL Final   • Folate 12/28/2021 4.47 (L)  4.78 - 24.20 ng/mL Final   • Total Cholesterol 12/28/2021 209 (H)  0 - 200 mg/dL Final   • Triglycerides 12/28/2021 90  0 - 150 mg/dL Final   • HDL Cholesterol 12/28/2021 75 (H)  40 - 60 mg/dL Final   • LDL Cholesterol  12/28/2021 118 (H)  0 - 100 mg/dL Final   • VLDL Cholesterol 12/28/2021 16  5 - 40 mg/dL Final   • LDL/HDL Ratio 12/28/2021 1.55   Final   • WBC 12/28/2021 6.85  3.40 - 10.80 10*3/mm3 Final   • RBC 12/28/2021 4.77  3.77 - 5.28 10*6/mm3 Final   • Hemoglobin 12/28/2021 15.8  12.0 - 15.9 g/dL Final   • Hematocrit 12/28/2021 46.7 (H)  34.0 - 46.6 % Final   • MCV 12/28/2021 97.9 (H)  79.0 - 97.0 fL Final   • MCH 12/28/2021 33.1 (H)  26.6 - 33.0 pg Final   • MCHC 12/28/2021 33.8  31.5 - 35.7 g/dL Final   • RDW 12/28/2021 12.1 (L)  12.3 - 15.4 % Final   • RDW-SD 12/28/2021 43.3  37.0 - 54.0 fl Final   • MPV 12/28/2021 9.6  6.0 - 12.0 fL Final   • Platelets 12/28/2021 411  140 - 450 10*3/mm3 Final   • Neutrophil % 12/28/2021 53.7  42.7 - 76.0 % Final   • Lymphocyte % 12/28/2021 29.6  19.6 - 45.3 % Final   • Monocyte % 12/28/2021 7.7  5.0 - 12.0 % Final   • Eosinophil % 12/28/2021 8.3 (H)  0.3 - 6.2 % Final   • Basophil % 12/28/2021 0.6  0.0 - 1.5 % Final   • Immature Grans % 12/28/2021 0.1  0.0 - 0.5 % Final    • Neutrophils, Absolute 12/28/2021 3.67  1.70 - 7.00 10*3/mm3 Final   • Lymphocytes, Absolute 12/28/2021 2.03  0.70 - 3.10 10*3/mm3 Final   • Monocytes, Absolute 12/28/2021 0.53  0.10 - 0.90 10*3/mm3 Final   • Eosinophils, Absolute 12/28/2021 0.57 (H)  0.00 - 0.40 10*3/mm3 Final   • Basophils, Absolute 12/28/2021 0.04  0.00 - 0.20 10*3/mm3 Final   • Immature Grans, Absolute 12/28/2021 0.01  0.00 - 0.05 10*3/mm3 Final   • nRBC 12/28/2021 0.0  0.0 - 0.2 /100 WBC Final   • Lipase 12/28/2021 44  13 - 60 U/L Final       EKG Results:  No orders to display       Imaging Results:  No Images in the past 120 days found..      Assessment/Plan   Diagnoses and all orders for this visit:    1. Grief reaction (Primary)  -     LORazepam (Ativan) 1 MG tablet; Take 0.5 to 1 tab PO QD PRN severe anxiety  Dispense: 30 tablet; Refill: 0  -     FLUoxetine (PROzac) 10 MG capsule; Take 1 cap PO QD x 1 week, if tolerated can increase to 2 tab PO QD  Dispense: 60 capsule; Refill: 1    2. Generalized anxiety disorder  -     FLUoxetine (PROzac) 10 MG capsule; Take 1 cap PO QD x 1 week, if tolerated can increase to 2 tab PO QD  Dispense: 60 capsule; Refill: 1    3. Anorexia nervosa, restricting type  -     FLUoxetine (PROzac) 10 MG capsule; Take 1 cap PO QD x 1 week, if tolerated can increase to 2 tab PO QD  Dispense: 60 capsule; Refill: 1    4. Insomnia, unspecified type  -     traZODone (DESYREL) 150 MG tablet; Take 1 tablet by mouth Every Night for 30 days.  Dispense: 30 tablet; Refill: 1    5. Panic disorder  -     LORazepam (Ativan) 1 MG tablet; Take 0.5 to 1 tab PO QD PRN severe anxiety  Dispense: 30 tablet; Refill: 0  -     FLUoxetine (PROzac) 10 MG capsule; Take 1 cap PO QD x 1 week, if tolerated can increase to 2 tab PO QD  Dispense: 60 capsule; Refill: 1    Presentation seems most consistent with ERICA, MDD, panic disorder, anorexia, insomnia.  We will refill propranolol for management of anxiety as needed.  We will start patient on  Prozac for management of depression, anxiety, and overall mood.  Will increase trazodone for management of sleep as needed.  We will start patient on Ativan for grief/panic attacks.  Counseled the patient on the risks including addiction, dependence, and misuse.  Discussed that this medication is short-term only.  Patient verbalized understanding and is agreeable to the plan.  Counseled the patient on nutrition and continuing to eat daily.  Recommended following up with hospice for grief counseling.  Follow-up in 1 month.  Addressed all questions and concerns.      Visit Diagnoses:    ICD-10-CM ICD-9-CM   1. Grief reaction  F43.21 309.0   2. Generalized anxiety disorder  F41.1 300.02   3. Anorexia nervosa, restricting type  F50.01 307.1   4. Insomnia, unspecified type  G47.00 780.52   5. Panic disorder  F41.0 300.01       PLAN:  1. Safety: No acute safety concerns at this time.   2. Therapy: Will refer to psychotherapy to Komal Espinosa LCSW.  3. Risk Assessment: Risk of self-harm acutely is moderate.  Risk factors include anxiety disorder, mood disorder, and recent psychosocial stressors (pandemic). Protective factors include no family history, denies access to guns/weapons, no present SI, no history of suicide attempts or self-harm in the past, minimal AODA, healthcare seeking, future orientation, willingness to engage in care.  Risk of self-harm chronically is also moderate, but could be further elevated in the event of treatment noncompliance and/or AODA.  4. Labs/Diagnostics Ordered:   No orders of the defined types were placed in this encounter.    5. Medications:   New Medications Ordered This Visit   Medications   • LORazepam (Ativan) 1 MG tablet     Sig: Take 0.5 to 1 tab PO QD PRN severe anxiety     Dispense:  30 tablet     Refill:  0   • FLUoxetine (PROzac) 10 MG capsule     Sig: Take 1 cap PO QD x 1 week, if tolerated can increase to 2 tab PO QD     Dispense:  60 capsule     Refill:  1   • traZODone  (DESYREL) 150 MG tablet     Sig: Take 1 tablet by mouth Every Night for 30 days.     Dispense:  30 tablet     Refill:  1       Discussed all risks, benefits, alternatives, and side effects of Trazodone including but not limited to GI upset, sexual dysfunction, dizziness, headache, nervousness, bleeding risk, seizure risk, sedation, headache, activation of elena or hypomania, increased fragility fracture risk, cardiac arrhythmias, priapism, hyponatremia, ocular effects, prolonged QT interval, withdrawal syndrome following abrupt discontinuation, serotonin syndrome, and activation of suicidal ideation and behavior.  Pt educated on the need to practice safe sex while taking this med. Discussed the need for pt to immediately call the office for any new or worsening symptoms, such as worsening depression; feeling nervous or restless; suicidal thoughts or actions; or other changes changes in mood or behavior, and all other concerns. Pt educated on med compliance and the risks of suddenly stopping this medication or missing doses. Pt verbalized understanding and is agreeable to taking Trazodone. Addressed all questions and concerns.     Propranolol, Risks, benefits, alternatives discussed with patient including dizziness, sedation, falls, low blood pressure, low heart rate, possible exacerbation of asthma.  After discussion of these risks and benefits, the patient voiced understanding and agreed to proceed.    Discussed all risks, benefits, alternatives, and side effects of Fluoxetine including but not limited to GI upset, decreased appetite, sexual dysfunction, bleeding risk, seizure risk, insomnia, anxiety, drowsiness, headache, asthenia, tremor, nervousness, activation of elena or hypomania, increased fragility fracture risk, hyponatremia, ocular effects, withdrawal syndrome following abrupt discontinuation, serotonin syndrome, hypersensitivity reaction, and activation of suicidal ideation and behavior.  Pt educated on  the need to practice safe sex while taking this med. Discussed the need for pt to immediately call the office for any new or worsening symptoms, such as worsening depression; feeling nervous or restless; suicidal thoughts or actions; or other changes changes in mood or behavior, and all other concerns. Pt educated on med compliance and the risks of suddenly stopping this medication or missing doses. Pt verbalized understanding and is agreeable to taking Fluoxetine. Addressed all questions and concerns.     Discussed all risks, benefits, alternatives, and side effects of Lorazepam including but not limited to risks of abuse, misuse, and addiction, which can lead to overdose or death; risks of dependence and withdrawal reactions; drowsiness, sedation, fatigue, depression, dizziness, ataxia, weakness, confusion, forgetfulness, hypotension, falls risk, respiratory depression, anterograde amnesia, paradoxical reactions such as hyperactivity or aggressive behavior; and hallucinations. Pt educated on the need to practice safe sex while taking this med. Instructed pt to avoid performing tasks that require mental alertness such as driving or operating machinery. Discussed the need for pt to immediately call the office for any new or worsening symptoms, such as changes in mood or behavior, and all other concerns. Pt educated on med compliance, including the proper use and monitoring for signs and symptoms of abuse, misuse, and addiction. Pt verbalized understanding and is agreeable to taking Lorazepam. LAI obtained and USD ordered. Controlled substances agreement verbally signed. Addressed all questions and concerns.       6. Follow up:   F/u in 1 month.    TREATMENT PLAN/GOALS: Continue supportive psychotherapy efforts and medications as indicated. Treatment and medication options discussed during today's visit. Patient ackowledged and verbally consented to continue with current treatment plan and was educated on the  importance of compliance with treatment and follow-up appointments.    MEDICATION ISSUES:  LAI reviewed as expected.  Discussed medication options and treatment plan of prescribed medication as well as the risks, benefits, and side effects including potential falls, possible impaired driving and metabolic adversities among others. Patient is agreeable to call the office with any worsening of symptoms or onset of side effects. Patient is agreeable to call 911 or go to the nearest ER should he/she begin having SI/HI. No medication side effects or related complaints today.       18 minutes of supportive psychotherapy with goal to strengthen defenses, promote problems solving, restore adaptive functioning and provide symptom relief. The therapeutic alliance was strengthened to encourage the patient to express their thoughts and feelings. Esteem building was enhanced through praise, reassurance, normalizing and encouragement. Coping skills were enhanced using mindfulness (deep breathing, progressive muscle relaxation, imagery, grounding & meditation) and cognitive behavioral strategies (reviewing cognitive distortions, negative self-talk/thought stopping and cognitive restructuring, through de-catastrophizing and examining options/alternatives) to build distress tolerance skills and emotional regulation.  Patient encouraged to practice negative thought stopping, in which the patient recognizes negative thoughts early and attempts to replace these thoughts with positive thoughts. Patient given education on medication side effects, diagnosis/illness and relapse symptoms. Plan to continue supportive psychotherapy in next appointment to provide symptom relief.          This document has been electronically signed by Mercedes Toro PA-C  December 13, 2022 16:32 EST      Part of this note may be an electronic transcription/translation of spoken language to printed text using the Dragon Dictation System.

## 2022-12-22 RX ORDER — TOPIRAMATE 100 MG/1
TABLET, FILM COATED ORAL
Qty: 270 TABLET | Refills: 0 | Status: SHIPPED | OUTPATIENT
Start: 2022-12-22

## 2023-01-10 ENCOUNTER — TELEMEDICINE (OUTPATIENT)
Dept: BEHAVIORAL HEALTH | Facility: CLINIC | Age: 43
End: 2023-01-10
Payer: COMMERCIAL

## 2023-01-10 DIAGNOSIS — G47.00 INSOMNIA, UNSPECIFIED TYPE: ICD-10-CM

## 2023-01-10 DIAGNOSIS — F41.1 GENERALIZED ANXIETY DISORDER: ICD-10-CM

## 2023-01-10 DIAGNOSIS — F41.0 PANIC DISORDER: ICD-10-CM

## 2023-01-10 DIAGNOSIS — F50.01 ANOREXIA NERVOSA, RESTRICTING TYPE: ICD-10-CM

## 2023-01-10 DIAGNOSIS — F43.21 GRIEF REACTION: ICD-10-CM

## 2023-01-10 PROCEDURE — 99214 OFFICE O/P EST MOD 30 MIN: CPT | Performed by: PHYSICIAN ASSISTANT

## 2023-01-10 RX ORDER — TRAZODONE HYDROCHLORIDE 150 MG/1
150 TABLET ORAL NIGHTLY
Qty: 30 TABLET | Refills: 1 | Status: SHIPPED | OUTPATIENT
Start: 2023-01-10 | End: 2023-02-13 | Stop reason: SDUPTHER

## 2023-01-10 RX ORDER — TIZANIDINE 2 MG/1
TABLET ORAL
COMMUNITY
Start: 2022-12-17

## 2023-01-10 RX ORDER — FLUOXETINE 10 MG/1
CAPSULE ORAL
Qty: 30 CAPSULE | Refills: 1 | Status: SHIPPED | OUTPATIENT
Start: 2023-01-10 | End: 2023-02-13

## 2023-01-10 RX ORDER — NALOXONE HYDROCHLORIDE 4 MG/.1ML
SPRAY NASAL
COMMUNITY

## 2023-01-10 RX ORDER — OXYCODONE HYDROCHLORIDE AND ACETAMINOPHEN 5; 325 MG/1; MG/1
TABLET ORAL EVERY 6 HOURS
COMMUNITY

## 2023-01-10 RX ORDER — FLUOXETINE HYDROCHLORIDE 20 MG/1
CAPSULE ORAL
Qty: 30 CAPSULE | Refills: 1 | Status: SHIPPED | OUTPATIENT
Start: 2023-01-10 | End: 2023-02-13

## 2023-01-10 RX ORDER — PROPRANOLOL HYDROCHLORIDE 80 MG/1
80 TABLET ORAL 3 TIMES DAILY PRN
Qty: 90 TABLET | Refills: 1 | Status: SHIPPED | OUTPATIENT
Start: 2023-01-10 | End: 2023-02-13 | Stop reason: SDUPTHER

## 2023-01-10 RX ORDER — MAGNESIUM HYDROXIDE 1200 MG/15ML
LIQUID ORAL
COMMUNITY

## 2023-01-10 NOTE — PROGRESS NOTES
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 past 120 days found..      Assessment/Plan   Diagnoses and all orders for this visit:    1. Generalized anxiety disorder  -     FLUoxetine (PROzac) 20 MG capsule; Take 1 cap PO QD. Take with 10mg cap for total dose of 30mg  Dispense: 30 capsule; Refill: 1  -     FLUoxetine (PROzac) 10 MG capsule; Take 1 cap PO QD. Take with 20mg cap for total dose of 30mg  Dispense: 30 capsule; Refill: 1    2. Anorexia nervosa, restricting type  -     FLUoxetine (PROzac) 20 MG capsule; Take 1 cap PO QD. Take with 10mg cap for total dose of 30mg  Dispense: 30 capsule; Refill: 1  -     FLUoxetine (PROzac) 10 MG capsule; Take 1 cap PO QD. Take with 20mg cap for total dose of 30mg  Dispense: 30 capsule; Refill: 1    3. Panic disorder  -     propranolol (INDERAL) 80 MG tablet; Take 1 tablet by mouth 3 (Three) Times a Day As Needed (anxiety) for up to 30 days.  Dispense: 90 tablet; Refill: 1  -     FLUoxetine (PROzac) 20 MG capsule; Take 1 cap PO QD. Take with 10mg cap for total dose of 30mg  Dispense: 30 capsule; Refill: 1  -     FLUoxetine (PROzac) 10 MG capsule; Take 1 cap PO QD. Take with 20mg cap for total dose of 30mg  Dispense: 30 capsule; Refill: 1    4. Grief reaction  -     FLUoxetine (PROzac) 20 MG capsule; Take 1 cap PO QD.  Take with 10mg cap for total dose of 30mg  Dispense: 30 capsule; Refill: 1  -     FLUoxetine (PROzac) 10 MG capsule; Take 1 cap PO QD. Take with 20mg cap for total dose of 30mg  Dispense: 30 capsule; Refill: 1    5. Insomnia, unspecified type  -     traZODone (DESYREL) 150 MG tablet; Take 1 tablet by mouth Every Night for 30 days.  Dispense: 30 tablet; Refill: 1    Presentation seems most consistent with ERICA, MDD, panic disorder, anorexia, insomnia.  We will increase Prozac for management depression, anxiety, and overall mood.  We will increase propranolol for management of anxiety as needed.  Counseled the patient on the need to monitor for any low blood pressure, dizziness, lightheadedness.  We will continue Ativan as needed for panic attacks.  Patient does not need a refill at this time.  We will continue trazodone as needed for sleep.  Reiterated need for psychotherapy.  Counseled the patient on nutrition and continuing to eat daily.  Follow-up in 1 month.  Addressed all questions and concerns.       Visit Diagnoses:    ICD-10-CM ICD-9-CM   1. Generalized anxiety disorder  F41.1 300.02   2. Anorexia nervosa, restricting type  F50.01 307.1   3. Panic disorder  F41.0 300.01   4. Grief reaction  F43.21 309.0   5. Insomnia, unspecified type  G47.00 780.52       PLAN:  1. Safety: No acute safety concerns at this time.   2. Therapy: Will refer to psychotherapy to Komal Espinosa LCSW.  3. Risk Assessment: Risk of self-harm acutely is moderate.  Risk factors include anxiety disorder, mood disorder, and recent psychosocial stressors (pandemic). Protective factors include no family history, denies access to guns/weapons, no present SI, no history of suicide attempts or self-harm in the past, minimal AODA, healthcare seeking, future orientation, willingness to engage in care.  Risk of self-harm chronically is also moderate, but could be further elevated in the event of treatment noncompliance and/or  AODA.  4. Labs/Diagnostics Ordered:   No orders of the defined types were placed in this encounter.    5. Medications:   New Medications Ordered This Visit   Medications   • propranolol (INDERAL) 80 MG tablet     Sig: Take 1 tablet by mouth 3 (Three) Times a Day As Needed (anxiety) for up to 30 days.     Dispense:  90 tablet     Refill:  1   • FLUoxetine (PROzac) 20 MG capsule     Sig: Take 1 cap PO QD. Take with 10mg cap for total dose of 30mg     Dispense:  30 capsule     Refill:  1   • FLUoxetine (PROzac) 10 MG capsule     Sig: Take 1 cap PO QD. Take with 20mg cap for total dose of 30mg     Dispense:  30 capsule     Refill:  1   • traZODone (DESYREL) 150 MG tablet     Sig: Take 1 tablet by mouth Every Night for 30 days.     Dispense:  30 tablet     Refill:  1       Discussed all risks, benefits, alternatives, and side effects of Trazodone including but not limited to GI upset, sexual dysfunction, dizziness, headache, nervousness, bleeding risk, seizure risk, sedation, headache, activation of elena or hypomania, increased fragility fracture risk, cardiac arrhythmias, priapism, hyponatremia, ocular effects, prolonged QT interval, withdrawal syndrome following abrupt discontinuation, serotonin syndrome, and activation of suicidal ideation and behavior.  Pt educated on the need to practice safe sex while taking this med. Discussed the need for pt to immediately call the office for any new or worsening symptoms, such as worsening depression; feeling nervous or restless; suicidal thoughts or actions; or other changes changes in mood or behavior, and all other concerns. Pt educated on med compliance and the risks of suddenly stopping this medication or missing doses. Pt verbalized understanding and is agreeable to taking Trazodone. Addressed all questions and concerns.     Propranolol, Risks, benefits, alternatives discussed with patient including dizziness, sedation, falls, low blood pressure, low heart rate, possible  exacerbation of asthma.  After discussion of these risks and benefits, the patient voiced understanding and agreed to proceed.    Discussed all risks, benefits, alternatives, and side effects of Fluoxetine including but not limited to GI upset, decreased appetite, sexual dysfunction, bleeding risk, seizure risk, insomnia, anxiety, drowsiness, headache, asthenia, tremor, nervousness, activation of elena or hypomania, increased fragility fracture risk, hyponatremia, ocular effects, withdrawal syndrome following abrupt discontinuation, serotonin syndrome, hypersensitivity reaction, and activation of suicidal ideation and behavior.  Pt educated on the need to practice safe sex while taking this med. Discussed the need for pt to immediately call the office for any new or worsening symptoms, such as worsening depression; feeling nervous or restless; suicidal thoughts or actions; or other changes changes in mood or behavior, and all other concerns. Pt educated on med compliance and the risks of suddenly stopping this medication or missing doses. Pt verbalized understanding and is agreeable to taking Fluoxetine. Addressed all questions and concerns.     Discussed all risks, benefits, alternatives, and side effects of Lorazepam including but not limited to risks of abuse, misuse, and addiction, which can lead to overdose or death; risks of dependence and withdrawal reactions; drowsiness, sedation, fatigue, depression, dizziness, ataxia, weakness, confusion, forgetfulness, hypotension, falls risk, respiratory depression, anterograde amnesia, paradoxical reactions such as hyperactivity or aggressive behavior; and hallucinations. Pt educated on the need to practice safe sex while taking this med. Instructed pt to avoid performing tasks that require mental alertness such as driving or operating machinery. Discussed the need for pt to immediately call the office for any new or worsening symptoms, such as changes in mood or  behavior, and all other concerns. Pt educated on med compliance, including the proper use and monitoring for signs and symptoms of abuse, misuse, and addiction. Pt verbalized understanding and is agreeable to taking Lorazepam. LAI obtained and USD ordered. Controlled substances agreement verbally signed. Addressed all questions and concerns.       6. Follow up:   F/u in 1 month.    TREATMENT PLAN/GOALS: Continue supportive psychotherapy efforts and medications as indicated. Treatment and medication options discussed during today's visit. Patient ackowledged and verbally consented to continue with current treatment plan and was educated on the importance of compliance with treatment and follow-up appointments.    MEDICATION ISSUES:  LAI reviewed as expected.  Discussed medication options and treatment plan of prescribed medication as well as the risks, benefits, and side effects including potential falls, possible impaired driving and metabolic adversities among others. Patient is agreeable to call the office with any worsening of symptoms or onset of side effects. Patient is agreeable to call 911 or go to the nearest ER should he/she begin having SI/HI. No medication side effects or related complaints today.          This document has been electronically signed by Mercedes Toro PA-C  January 10, 2023 13:37 EST      Part of this note may be an electronic transcription/translation of spoken language to printed text using the Dragon Dictation System.

## 2023-02-13 ENCOUNTER — TELEMEDICINE (OUTPATIENT)
Dept: BEHAVIORAL HEALTH | Facility: CLINIC | Age: 43
End: 2023-02-13
Payer: COMMERCIAL

## 2023-02-13 DIAGNOSIS — F33.1 MODERATE EPISODE OF RECURRENT MAJOR DEPRESSIVE DISORDER: ICD-10-CM

## 2023-02-13 DIAGNOSIS — F43.21 GRIEF REACTION: Primary | ICD-10-CM

## 2023-02-13 DIAGNOSIS — F41.0 PANIC DISORDER: ICD-10-CM

## 2023-02-13 DIAGNOSIS — G47.00 INSOMNIA, UNSPECIFIED TYPE: ICD-10-CM

## 2023-02-13 DIAGNOSIS — F41.1 GENERALIZED ANXIETY DISORDER: ICD-10-CM

## 2023-02-13 DIAGNOSIS — F50.01 ANOREXIA NERVOSA, RESTRICTING TYPE: ICD-10-CM

## 2023-02-13 PROCEDURE — 99214 OFFICE O/P EST MOD 30 MIN: CPT | Performed by: PHYSICIAN ASSISTANT

## 2023-02-13 RX ORDER — TRAZODONE HYDROCHLORIDE 150 MG/1
150 TABLET ORAL NIGHTLY
Qty: 30 TABLET | Refills: 1 | Status: SHIPPED | OUTPATIENT
Start: 2023-02-13 | End: 2023-03-13 | Stop reason: SDUPTHER

## 2023-02-13 RX ORDER — FLUOXETINE HYDROCHLORIDE 40 MG/1
40 CAPSULE ORAL DAILY
Qty: 30 CAPSULE | Refills: 2 | Status: SHIPPED | OUTPATIENT
Start: 2023-02-13 | End: 2023-03-13 | Stop reason: SDUPTHER

## 2023-02-13 RX ORDER — PROPRANOLOL HYDROCHLORIDE 80 MG/1
80 TABLET ORAL 3 TIMES DAILY PRN
Qty: 90 TABLET | Refills: 1 | Status: SHIPPED | OUTPATIENT
Start: 2023-02-13 | End: 2023-03-13 | Stop reason: SDUPTHER

## 2023-02-13 NOTE — PROGRESS NOTES
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 days.     Dispense:  30 capsule     Refill:  2       Discussed all risks, benefits, alternatives, and side effects of Trazodone including but not limited to GI upset, sexual dysfunction, dizziness, headache, nervousness, bleeding risk, seizure risk, sedation, headache, activation of elena or hypomania, increased fragility fracture risk, cardiac arrhythmias, priapism, hyponatremia, ocular effects, prolonged QT interval, withdrawal syndrome following abrupt discontinuation, serotonin syndrome, and activation of suicidal ideation and behavior.  Pt educated on the need to practice safe sex while taking this med. Discussed the need for pt to immediately call the office for any new or worsening symptoms, such as worsening depression; feeling nervous or restless; suicidal thoughts or actions; or other changes changes in mood or behavior, and all other concerns. Pt educated on med compliance and the risks of suddenly stopping this medication or missing doses. Pt verbalized understanding and is agreeable to taking Trazodone. Addressed all questions and concerns.     Propranolol, Risks, benefits, alternatives discussed with patient including dizziness, sedation, falls, low blood pressure, low heart rate,  possible exacerbation of asthma.  After discussion of these risks and benefits, the patient voiced understanding and agreed to proceed.    Discussed all risks, benefits, alternatives, and side effects of Fluoxetine including but not limited to GI upset, decreased appetite, sexual dysfunction, bleeding risk, seizure risk, insomnia, anxiety, drowsiness, headache, asthenia, tremor, nervousness, activation of elena or hypomania, increased fragility fracture risk, hyponatremia, ocular effects, withdrawal syndrome following abrupt discontinuation, serotonin syndrome, hypersensitivity reaction, and activation of suicidal ideation and behavior.  Pt educated on the need to practice safe sex while taking this med. Discussed the need for pt to immediately call the office for any new or worsening symptoms, such as worsening depression; feeling nervous or restless; suicidal thoughts or actions; or other changes changes in mood or behavior, and all other concerns. Pt educated on med compliance and the risks of suddenly stopping this medication or missing doses. Pt verbalized understanding and is agreeable to taking Fluoxetine. Addressed all questions and concerns.     Discussed all risks, benefits, alternatives, and side effects of Lorazepam including but not limited to risks of abuse, misuse, and addiction, which can lead to overdose or death; risks of dependence and withdrawal reactions; drowsiness, sedation, fatigue, depression, dizziness, ataxia, weakness, confusion, forgetfulness, hypotension, falls risk, respiratory depression, anterograde amnesia, paradoxical reactions such as hyperactivity or aggressive behavior; and hallucinations. Pt educated on the need to practice safe sex while taking this med. Instructed pt to avoid performing tasks that require mental alertness such as driving or operating machinery. Discussed the need for pt to immediately call the office for any new or worsening symptoms, such as changes in mood  or behavior, and all other concerns. Pt educated on med compliance, including the proper use and monitoring for signs and symptoms of abuse, misuse, and addiction. Pt verbalized understanding and is agreeable to taking Lorazepam. LAI obtained and USD ordered. Controlled substances agreement verbally signed. Addressed all questions and concerns.       6. Follow up:   F/u in 1 month.    TREATMENT PLAN/GOALS: Continue supportive psychotherapy efforts and medications as indicated. Treatment and medication options discussed during today's visit. Patient ackowledged and verbally consented to continue with current treatment plan and was educated on the importance of compliance with treatment and follow-up appointments.    MEDICATION ISSUES:  LAI reviewed as expected.  Discussed medication options and treatment plan of prescribed medication as well as the risks, benefits, and side effects including potential falls, possible impaired driving and metabolic adversities among others. Patient is agreeable to call the office with any worsening of symptoms or onset of side effects. Patient is agreeable to call 911 or go to the nearest ER should he/she begin having SI/HI. No medication side effects or related complaints today.          This document has been electronically signed by Mercedes Toro PA-C  February 13, 2023 08:38 EST      Part of this note may be an electronic transcription/translation of spoken language to printed text using the Dragon Dictation System.

## 2023-03-08 DIAGNOSIS — G43.909 MIGRAINE WITHOUT STATUS MIGRAINOSUS, NOT INTRACTABLE, UNSPECIFIED MIGRAINE TYPE: ICD-10-CM

## 2023-03-08 RX ORDER — SUMATRIPTAN 25 MG/1
TABLET, FILM COATED ORAL
Qty: 12 TABLET | Refills: 5 | Status: SHIPPED | OUTPATIENT
Start: 2023-03-08

## 2023-03-13 ENCOUNTER — TELEMEDICINE (OUTPATIENT)
Dept: BEHAVIORAL HEALTH | Facility: CLINIC | Age: 43
End: 2023-03-13
Payer: COMMERCIAL

## 2023-03-13 DIAGNOSIS — F50.01 ANOREXIA NERVOSA, RESTRICTING TYPE: ICD-10-CM

## 2023-03-13 DIAGNOSIS — G47.00 INSOMNIA, UNSPECIFIED TYPE: ICD-10-CM

## 2023-03-13 DIAGNOSIS — F33.1 MODERATE EPISODE OF RECURRENT MAJOR DEPRESSIVE DISORDER: ICD-10-CM

## 2023-03-13 DIAGNOSIS — F41.1 GENERALIZED ANXIETY DISORDER: ICD-10-CM

## 2023-03-13 DIAGNOSIS — F43.21 GRIEF REACTION: Primary | ICD-10-CM

## 2023-03-13 DIAGNOSIS — F41.0 PANIC DISORDER: ICD-10-CM

## 2023-03-13 PROCEDURE — 99213 OFFICE O/P EST LOW 20 MIN: CPT | Performed by: PHYSICIAN ASSISTANT

## 2023-03-13 RX ORDER — FLUOXETINE HYDROCHLORIDE 40 MG/1
40 CAPSULE ORAL DAILY
Qty: 90 CAPSULE | Refills: 0 | Status: SHIPPED | OUTPATIENT
Start: 2023-03-13 | End: 2023-06-11

## 2023-03-13 RX ORDER — PROPRANOLOL HYDROCHLORIDE 40 MG/1
40 TABLET ORAL 3 TIMES DAILY PRN
Qty: 270 TABLET | OUTPATIENT
Start: 2023-03-13 | End: 2023-04-12

## 2023-03-13 RX ORDER — PROPRANOLOL HYDROCHLORIDE 80 MG/1
80 TABLET ORAL 3 TIMES DAILY PRN
Qty: 90 TABLET | Refills: 1 | Status: SHIPPED | OUTPATIENT
Start: 2023-03-13 | End: 2023-04-12

## 2023-03-13 RX ORDER — TRAZODONE HYDROCHLORIDE 150 MG/1
150 TABLET ORAL NIGHTLY
Qty: 90 TABLET | Refills: 0 | Status: SHIPPED | OUTPATIENT
Start: 2023-03-13 | End: 2023-06-11

## 2023-03-13 NOTE — PROGRESS NOTES
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 depression, anterograde amnesia, paradoxical reactions such as hyperactivity or aggressive behavior; and hallucinations. Pt educated on the need to practice safe sex while taking this med. Instructed pt to avoid performing tasks that require mental alertness such as driving or operating machinery. Discussed the need for pt to immediately call the office for any new or worsening symptoms, such as changes in mood or behavior, and all other concerns. Pt educated on med compliance, including the proper use and monitoring for signs and symptoms of abuse, misuse, and addiction. Pt verbalized understanding and is agreeable to taking Lorazepam. LAI obtained and USD ordered. Controlled substances agreement verbally signed. Addressed all questions and concerns.       6. Follow up:   F/u in 1 month.    TREATMENT PLAN/GOALS: Continue supportive psychotherapy efforts and medications as indicated. Treatment and medication options discussed during today's visit. Patient ackowledged and verbally consented to continue with current treatment plan and was educated on the importance of compliance with treatment and follow-up appointments.    MEDICATION ISSUES:  LAI reviewed as  expected.  Discussed medication options and treatment plan of prescribed medication as well as the risks, benefits, and side effects including potential falls, possible impaired driving and metabolic adversities among others. Patient is agreeable to call the office with any worsening of symptoms or onset of side effects. Patient is agreeable to call 911 or go to the nearest ER should he/she begin having SI/HI. No medication side effects or related complaints today.          This document has been electronically signed by Mercedes Toro PA-C  March 13, 2023 08:21 EDT      Part of this note may be an electronic transcription/translation of spoken language to printed text using the Dragon Dictation System.

## 2023-03-13 NOTE — TELEPHONE ENCOUNTER
The original prescription was discontinued on 1/10/2023 by Mercedes Toro PA-C. Renewing this prescription may not be appropriate

## 2023-08-10 ENCOUNTER — TELEMEDICINE (OUTPATIENT)
Dept: BEHAVIORAL HEALTH | Facility: CLINIC | Age: 43
End: 2023-08-10
Payer: COMMERCIAL

## 2023-08-10 DIAGNOSIS — Z79.899 MEDICATION MANAGEMENT: ICD-10-CM

## 2023-08-10 DIAGNOSIS — F51.05 INSOMNIA DUE TO OTHER MENTAL DISORDER: ICD-10-CM

## 2023-08-10 DIAGNOSIS — F33.42 RECURRENT MAJOR DEPRESSIVE DISORDER, IN FULL REMISSION: ICD-10-CM

## 2023-08-10 DIAGNOSIS — F41.0 PANIC DISORDER: ICD-10-CM

## 2023-08-10 DIAGNOSIS — F99 INSOMNIA DUE TO OTHER MENTAL DISORDER: ICD-10-CM

## 2023-08-10 DIAGNOSIS — F50.01 ANOREXIA NERVOSA, RESTRICTING TYPE: ICD-10-CM

## 2023-08-10 DIAGNOSIS — F41.1 GENERALIZED ANXIETY DISORDER: Primary | ICD-10-CM

## 2023-08-10 RX ORDER — FLUOXETINE HYDROCHLORIDE 20 MG/1
CAPSULE ORAL
Qty: 60 CAPSULE | Refills: 1 | Status: SHIPPED | OUTPATIENT
Start: 2023-08-10

## 2023-08-10 RX ORDER — TRAZODONE HYDROCHLORIDE 150 MG/1
150 TABLET ORAL NIGHTLY
Qty: 90 TABLET | Refills: 0 | Status: SHIPPED | OUTPATIENT
Start: 2023-08-10 | End: 2023-11-08

## 2023-08-10 RX ORDER — PROPRANOLOL HYDROCHLORIDE 80 MG/1
TABLET ORAL
Qty: 90 TABLET | Refills: 1 | Status: SHIPPED | OUTPATIENT
Start: 2023-08-10

## 2023-08-10 NOTE — PROGRESS NOTES
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 ackowledged and verbally consented to continue with current treatment plan and was educated on the importance of compliance with treatment and follow-up appointments.    MEDICATION ISSUES:  LAI reviewed as expected.  Discussed medication options and treatment plan of prescribed medication as well as the risks, benefits, and side effects including potential falls, possible impaired driving and metabolic adversities among others. Patient is agreeable to call the office with any worsening of symptoms or onset of side effects. Patient is agreeable to call 911 or go to the nearest ER should he/she begin having SI/HI. No medication side effects or related complaints today.          This document has been electronically signed by Mercedes Toro PA-C  August 10, 2023 08:47 EDT      Part of this note may be an electronic transcription/translation of spoken language to printed text using the Dragon Dictation System.

## 2023-09-14 ENCOUNTER — TELEPHONE (OUTPATIENT)
Dept: BEHAVIORAL HEALTH | Facility: CLINIC | Age: 43
End: 2023-09-14
Payer: COMMERCIAL

## 2023-09-14 NOTE — TELEPHONE ENCOUNTER
Overdue lab protocol for the following lab.     Urine Drug Screen - Urine, Clean Catch (08/10/2023 08:43)     Called pt as a courtesy reminder to complete the following overdue lab via outpatient services or PCP office, no appt required. Pt reported the places that were recommended for her to go to in [place] no longer collect UDS. Pt was advised to try the outpatient labs in [place] that don't require an appt or to contact her PCP. Pt reported she'll contact her PCP office in regards to this, and if they can't collect it then she will go to outpatient lab when she has time between appts for her children. This was acknowledged.

## 2023-10-03 DIAGNOSIS — F41.0 PANIC DISORDER: ICD-10-CM

## 2023-10-03 NOTE — TELEPHONE ENCOUNTER
REFILL REQUEST FOR INDERAL 80 MG TABLET RECEIVED FROM PHARMACY.  propranolol (INDERAL) 80 MG tablet (08/10/2023)     FOLLOW UP APPT- NONE IN EPIC.  PT LAST SEEN ON 08/10/23.  PT NO SHOWED LAST APPT ON 09/15/23.    PT NEEDS AN APPOINTMENT.   I CALLED PT TO SCHEDULE APPOINTMENT.  PT DID NOT ANSWER.  I LEFT VOICEMAIL REQUESTING THAT PT CALL OUR OFFICE BACK.

## 2023-10-04 RX ORDER — PROPRANOLOL HYDROCHLORIDE 80 MG/1
TABLET ORAL
Qty: 90 TABLET | Refills: 0 | Status: SHIPPED | OUTPATIENT
Start: 2023-10-04

## 2023-10-04 NOTE — TELEPHONE ENCOUNTER
I CALLED AND SPOKE TO PT.  PT IS NOW SCHEDULED FOR 11/13/23 [facility] VIDEO APPT.    PT DUE FOR REFILL ON 10/10/23.    ORDER IS PENDED. PLEASE REVIEW.

## 2023-10-04 NOTE — TELEPHONE ENCOUNTER
Will refill but could you please see if patient can come into the office for an appointment so I can check her BP? Thank you!

## 2023-10-05 NOTE — TELEPHONE ENCOUNTER
I CALLED PT TO RELAY OF PROVIDERS MESSAGE.    PER PROVIDERS APPROVAL, PT CAN COME INTO OFFICE FOR A BLOOD PRESSURE CHECK ONLY OR PT CAN BE SCHEDULED FOR AN EARLIER APPT AS WELL; EITHER ONE.   PTS BLOOD PRESSURE DOES HAVE TO BE CHECKED AS PT IS ON PROPRANOLOL.    PT DID NOT ANSWER.  I LEFT A VOICEMAIL REQUESTING THAT PT CALL OUR OFFICE BACK.

## 2023-11-13 ENCOUNTER — TELEMEDICINE (OUTPATIENT)
Dept: BEHAVIORAL HEALTH | Facility: CLINIC | Age: 43
End: 2023-11-13
Payer: COMMERCIAL

## 2023-11-13 DIAGNOSIS — F50.01 ANOREXIA NERVOSA, RESTRICTING TYPE: ICD-10-CM

## 2023-11-13 DIAGNOSIS — F41.0 PANIC DISORDER: ICD-10-CM

## 2023-11-13 DIAGNOSIS — F41.1 GENERALIZED ANXIETY DISORDER: Primary | ICD-10-CM

## 2023-11-13 DIAGNOSIS — F51.05 INSOMNIA DUE TO OTHER MENTAL DISORDER: ICD-10-CM

## 2023-11-13 DIAGNOSIS — F99 INSOMNIA DUE TO OTHER MENTAL DISORDER: ICD-10-CM

## 2023-11-13 DIAGNOSIS — F33.42 RECURRENT MAJOR DEPRESSIVE DISORDER, IN FULL REMISSION: ICD-10-CM

## 2023-11-13 PROCEDURE — 99214 OFFICE O/P EST MOD 30 MIN: CPT | Performed by: PHYSICIAN ASSISTANT

## 2023-11-13 RX ORDER — FLUOXETINE 10 MG/1
10 CAPSULE ORAL EVERY MORNING
Qty: 90 CAPSULE | Refills: 0 | Status: SHIPPED | OUTPATIENT
Start: 2023-11-13 | End: 2024-02-11

## 2023-11-13 RX ORDER — FLUOXETINE HYDROCHLORIDE 40 MG/1
40 CAPSULE ORAL EVERY MORNING
Qty: 90 CAPSULE | Refills: 0 | Status: SHIPPED | OUTPATIENT
Start: 2023-11-13 | End: 2024-02-11

## 2023-11-13 RX ORDER — PROPRANOLOL HYDROCHLORIDE 80 MG/1
TABLET ORAL
Qty: 90 TABLET | Refills: 1 | Status: SHIPPED | OUTPATIENT
Start: 2023-11-13
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
## 2024-01-04 DIAGNOSIS — F41.0 PANIC DISORDER: ICD-10-CM

## 2024-01-04 NOTE — TELEPHONE ENCOUNTER
ATTEMPTED TO CONTACT PT(PATIENT)      NO ANSWER      LEFT VOICEMAIL WITH INSTRUCTIONS TO RETURN CALL TO OFFICE AT [phone]

## 2024-01-04 NOTE — TELEPHONE ENCOUNTER
NEXT VISIT WITH PROVIDER   NONE IN [facility]    LAST SEEN BY PROVIDER   Telemedicine with Mercedes Toro PA-C (11/13/2023)     LAST MED REFILL  propranolol (INDERAL) 80 MG tablet (11/13/2023)   Dispense Quantity: 90 tablet Refills: 1          Sig: TAKE 1/2 to 1 TABLET BY MOUTH THREE TIMES DAILY AS NEEDED FOR ANXIETY     TOO SOON FOR REFILL

## 2024-01-05 NOTE — TELEPHONE ENCOUNTER
I ATTEMPTED TO CONTACT PT BY TELEPHONE TO SCHEDULE A FOLLOW UP APPT FOR PROVIDERS NEXT AVAILABLE.    PT DID NOT ANSWER.   I LEFT A VOICEMAIL REQUESTING THAT PT CALL OUR OFFICE BACK.

## 2024-01-09 RX ORDER — PROPRANOLOL HYDROCHLORIDE 80 MG/1
TABLET ORAL
Qty: 90 TABLET | Refills: 1 | OUTPATIENT
Start: 2024-01-09

## 2024-01-09 NOTE — TELEPHONE ENCOUNTER
ATTEMPTED TO CONTACT PT(PATIENT)      NO ANSWER      LEFT VOICEMAIL WITH INSTRUCTIONS TO RETURN CALL TO OFFICE AT [phone]

## 2024-03-10 DIAGNOSIS — G43.909 MIGRAINE WITHOUT STATUS MIGRAINOSUS, NOT INTRACTABLE, UNSPECIFIED MIGRAINE TYPE: ICD-10-CM

## 2024-03-11 RX ORDER — SUMATRIPTAN 25 MG/1
TABLET, FILM COATED ORAL
Qty: 12 TABLET | Refills: 5 | OUTPATIENT
Start: 2024-03-11